# Patient Record
Sex: MALE | Race: WHITE | NOT HISPANIC OR LATINO | URBAN - METROPOLITAN AREA
[De-identification: names, ages, dates, MRNs, and addresses within clinical notes are randomized per-mention and may not be internally consistent; named-entity substitution may affect disease eponyms.]

---

## 2024-10-25 ENCOUNTER — EMERGENCY (EMERGENCY)
Facility: HOSPITAL | Age: 39
LOS: 0 days | Discharge: HOME | End: 2024-10-25
Attending: EMERGENCY MEDICINE
Payer: COMMERCIAL

## 2024-10-25 VITALS
WEIGHT: 195.11 LBS | DIASTOLIC BLOOD PRESSURE: 80 MMHG | RESPIRATION RATE: 18 BRPM | HEIGHT: 75 IN | SYSTOLIC BLOOD PRESSURE: 116 MMHG | HEART RATE: 70 BPM | OXYGEN SATURATION: 100 % | TEMPERATURE: 98 F

## 2024-10-25 PROCEDURE — 73610 X-RAY EXAM OF ANKLE: CPT | Mod: RT

## 2024-10-25 PROCEDURE — 73610 X-RAY EXAM OF ANKLE: CPT | Mod: 26,RT

## 2024-10-25 PROCEDURE — 99284 EMERGENCY DEPT VISIT MOD MDM: CPT

## 2024-10-25 PROCEDURE — 99283 EMERGENCY DEPT VISIT LOW MDM: CPT | Mod: 25

## 2024-10-25 NOTE — ED ADULT TRIAGE NOTE - HEART RATE (BEATS/MIN)
Physical Therapy Treatment    Patient Name:  Saji Castañeda   MRN:  [id number]    Recommendations:     Discharge Recommendations: LTACH (long-term acute care hospital)  Discharge Equipment Recommendations:  (TBD)  Barriers to discharge:  decreased mobility,strength and endurance    Assessment:     Saji Castañeda is a 74 y.o. male admitted with a medical diagnosis of Chronic osteomyelitis.  He presents with the following impairments/functional limitations: weakness, impaired endurance, impaired functional mobility, decreased upper extremity function, decreased lower extremity function, decreased ROM, impaired coordination, impaired skin, orthopedic precautions,pt with good participation and requires assistance with mobility at this time,unable to stand,pt will benefit from LTACH services upon discharge.    Rehab Prognosis: Fair; patient would benefit from acute skilled PT services to address these deficits and reach maximum level of function.    Recent Surgery: * No surgery found *      Plan:     During this hospitalization, patient to be seen 5 x/week to address the identified rehab impairments via therapeutic activities, therapeutic exercises, neuromuscular re-education and progress toward the following goals:    Plan of Care Expires:  06/20/23    Subjective     Chief Complaint: n/a  Patient/Family Comments/goals: pt agreeable to rx.  Pain/Comfort:  Pain Rating 1:  (no c/o's)      Objective:     Communicated with nsg prior to session.  Patient found supine with bed alarm, Condom Catheter, peripheral IV upon PT entry to room.     General Precautions: Standard, contact, fall  Orthopedic Precautions: LLE non weight bearing  Braces:  (R Darco)  Respiratory Status: Room air     Functional Mobility:  Bed Mobility:     Supine to Sit: supervision  Balance: good sitting balance      AM-PAC 6 CLICK MOBILITY  Turning over in bed (including adjusting bedclothes, sheets and blankets)?: 4  Sitting down on and standing up from a  chair with arms (e.g., wheelchair, bedside commode, etc.): 2  Moving from lying on back to sitting on the side of the bed?: 3  Moving to and from a bed to a chair (including a wheelchair)?: 2  Need to walk in hospital room?: 1  Climbing 3-5 steps with a railing?: 1  Basic Mobility Total Score: 13       Treatment & Education: le seated ex's 2 X 10 reps,EOB ~12 mins with S.      Patient left supine with all lines intact, call button in reach, and bed alarm on..    GOALS: see general POC  Multidisciplinary Problems       Physical Therapy Goals          Problem: Physical Therapy    Goal Priority Disciplines Outcome Goal Variances Interventions   Physical Therapy Goal     PT, PT/OT Ongoing, Progressing     Description: Goals to be met by: 23     Patient will increase functional independence with mobility by performin. Supine to sit with Modified [place]  2. Sit to supine with Modified [place]  3. Bed to chair transfer with Supervision  squat pivot or using sliding board   4. Wheelchair propulsion x 100 feet with Modified [place] using bilateral uppper extremities                         Time Tracking:     PT Received On: 23  PT Start Time: 1004     PT Stop Time: 1027  PT Total Time (min): 23 min     Billable Minutes: Therapeutic Activity 13 and Therapeutic Exercise 10    Treatment Type: Treatment  PT/PTA: PTA     Number of PTA visits since last PT visit: 3     2023   70

## 2024-10-25 NOTE — ED PROVIDER NOTE - PHYSICAL EXAMINATION
CONSTITUTIONAL: well-appearing, well nourished, non-toxic, NAD  SKIN: circumferential scaly rashes to b/l anterior shins, stage 3 pressure ulcer to right heel, stage 4 ulcer to left heel and lateral malleolus  HEAD: NCAT  EYES: EOMI, no scleral icterus  NECK: Full ROM.   EXT: right ankle swelling to lateral aspect, capillary refill intact, 2+ pt, and dp pulses  NEURO: no motor or sensation of b/l LE, in wheelchair   PSYCH: Cooperative, appropriate.

## 2024-10-25 NOTE — ED PROVIDER NOTE - PATIENT PORTAL LINK FT
You can access the FollowMyHealth Patient Portal offered by [facility] by registering at the following website: [email or web address]. By joining [employer]’s FollowMyHealth portal, you will also be able to view your health information using other applications (apps) compatible with our system.

## 2024-10-25 NOTE — ED PROVIDER NOTE - NSFOLLOWUPINSTRUCTIONS_ED_ALL_ED_FT
Follow up with your primary care physician  You do not have any noted fractures on the xrays    Here are some tips for treating ankle swelling:   RICE: For ankle injuries, you can try the RICE method for three days:   Rest: Avoid walking on the injured ankle   Ice: Apply ice packs to the ankle   Compression: Wrap the ankle with an elastic bandage   Elevation: Keep your toes raised above your nose   Support stockings: Put on support stockings in the morning before swelling worsens   Exercise: Move your feet and ankles often, and exercise your legs to pump fluid back to your heart   Diet: Eat a balanced diet and limit salt, which can increase swelling   Hydration: Drink enough water to help your body flush excess sodium   Avoid tight clothing: Don't wear tight clothing or garters around your thighs   Take breaks: When traveling, take breaks to stand up and move around   You should see a doctor if:   Your ankle is significantly swollen   You're limping after more than three days   You experience pain or discolored skin in the swollen area   You have an open sore on the swollen area   You have shortness of breath   You have swelling in only one limb   You have difficulty walking or moving   Ankle swelling can be caused by:   Prolonged sitting or standing   Being overweight   Aging   A blood clot in the leg   A leg infection   Venous insufficiency, which is when the veins in the legs can't pump blood back to the heart   Side effects from certain medications, such as hormone therapy, contraceptive pills, steroids, blood pressure medications, and antidepressants

## 2024-10-25 NOTE — ED ADULT NURSE NOTE - NSFALLRISKINTERV_ED_ALL_ED

## 2024-10-25 NOTE — ED ADULT TRIAGE NOTE - CHIEF COMPLAINT QUOTE
right ankle swelling, may have hit it while transferring himself from wheelchair but doesn't remember
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
## 2025-01-02 ENCOUNTER — INPATIENT (INPATIENT)
Facility: HOSPITAL | Age: 40
LOS: 10 days | Discharge: HOME HEALTH | DRG: 383 | End: 2025-01-13
Attending: STUDENT IN AN ORGANIZED HEALTH CARE EDUCATION/TRAINING PROGRAM | Admitting: HOSPITALIST
Payer: MEDICAID

## 2025-01-02 VITALS
TEMPERATURE: 98 F | OXYGEN SATURATION: 100 % | WEIGHT: 195.11 LBS | SYSTOLIC BLOOD PRESSURE: 119 MMHG | RESPIRATION RATE: 16 BRPM | HEART RATE: 76 BPM | DIASTOLIC BLOOD PRESSURE: 82 MMHG

## 2025-01-02 DIAGNOSIS — L08.9 LOCAL INFECTION OF THE SKIN AND SUBCUTANEOUS TISSUE, UNSPECIFIED: ICD-10-CM

## 2025-01-02 LAB
ALBUMIN SERPL ELPH-MCNC: 4.4 G/DL — SIGNIFICANT CHANGE UP (ref 3.5–5.2)
ALP SERPL-CCNC: 92 U/L — SIGNIFICANT CHANGE UP (ref 30–115)
ALT FLD-CCNC: 11 U/L — SIGNIFICANT CHANGE UP (ref 0–41)
ANION GAP SERPL CALC-SCNC: 10 MMOL/L — SIGNIFICANT CHANGE UP (ref 7–14)
AST SERPL-CCNC: 14 U/L — SIGNIFICANT CHANGE UP (ref 0–41)
BASOPHILS # BLD AUTO: 0.05 K/UL — SIGNIFICANT CHANGE UP (ref 0–0.2)
BASOPHILS NFR BLD AUTO: 0.6 % — SIGNIFICANT CHANGE UP (ref 0–1)
BILIRUB SERPL-MCNC: 0.3 MG/DL — SIGNIFICANT CHANGE UP (ref 0.2–1.2)
BUN SERPL-MCNC: 16 MG/DL — SIGNIFICANT CHANGE UP (ref 10–20)
CALCIUM SERPL-MCNC: 9.3 MG/DL — SIGNIFICANT CHANGE UP (ref 8.4–10.5)
CHLORIDE SERPL-SCNC: 103 MMOL/L — SIGNIFICANT CHANGE UP (ref 98–110)
CO2 SERPL-SCNC: 25 MMOL/L — SIGNIFICANT CHANGE UP (ref 17–32)
CREAT SERPL-MCNC: 0.7 MG/DL — SIGNIFICANT CHANGE UP (ref 0.7–1.5)
EGFR: 120 ML/MIN/1.73M2 — SIGNIFICANT CHANGE UP
EOSINOPHIL # BLD AUTO: 0.21 K/UL — SIGNIFICANT CHANGE UP (ref 0–0.7)
EOSINOPHIL NFR BLD AUTO: 2.4 % — SIGNIFICANT CHANGE UP (ref 0–8)
GLUCOSE SERPL-MCNC: 96 MG/DL — SIGNIFICANT CHANGE UP (ref 70–99)
HCT VFR BLD CALC: 47 % — SIGNIFICANT CHANGE UP (ref 42–52)
HGB BLD-MCNC: 15.8 G/DL — SIGNIFICANT CHANGE UP (ref 14–18)
IMM GRANULOCYTES NFR BLD AUTO: 0.3 % — SIGNIFICANT CHANGE UP (ref 0.1–0.3)
LACTATE SERPL-SCNC: 0.7 MMOL/L — SIGNIFICANT CHANGE UP (ref 0.7–2)
LYMPHOCYTES # BLD AUTO: 2.19 K/UL — SIGNIFICANT CHANGE UP (ref 1.2–3.4)
LYMPHOCYTES # BLD AUTO: 25 % — SIGNIFICANT CHANGE UP (ref 20.5–51.1)
MCHC RBC-ENTMCNC: 28.5 PG — SIGNIFICANT CHANGE UP (ref 27–31)
MCHC RBC-ENTMCNC: 33.6 G/DL — SIGNIFICANT CHANGE UP (ref 32–37)
MCV RBC AUTO: 84.7 FL — SIGNIFICANT CHANGE UP (ref 80–94)
MONOCYTES # BLD AUTO: 0.78 K/UL — HIGH (ref 0.1–0.6)
MONOCYTES NFR BLD AUTO: 8.9 % — SIGNIFICANT CHANGE UP (ref 1.7–9.3)
NEUTROPHILS # BLD AUTO: 5.5 K/UL — SIGNIFICANT CHANGE UP (ref 1.4–6.5)
NEUTROPHILS NFR BLD AUTO: 62.8 % — SIGNIFICANT CHANGE UP (ref 42.2–75.2)
NRBC # BLD: 0 /100 WBCS — SIGNIFICANT CHANGE UP (ref 0–0)
PLATELET # BLD AUTO: 221 K/UL — SIGNIFICANT CHANGE UP (ref 130–400)
PMV BLD: 9.6 FL — SIGNIFICANT CHANGE UP (ref 7.4–10.4)
POTASSIUM SERPL-MCNC: 4.3 MMOL/L — SIGNIFICANT CHANGE UP (ref 3.5–5)
POTASSIUM SERPL-SCNC: 4.3 MMOL/L — SIGNIFICANT CHANGE UP (ref 3.5–5)
PROT SERPL-MCNC: 7.5 G/DL — SIGNIFICANT CHANGE UP (ref 6–8)
RBC # BLD: 5.55 M/UL — SIGNIFICANT CHANGE UP (ref 4.7–6.1)
RBC # FLD: 12.9 % — SIGNIFICANT CHANGE UP (ref 11.5–14.5)
SODIUM SERPL-SCNC: 138 MMOL/L — SIGNIFICANT CHANGE UP (ref 135–146)
WBC # BLD: 8.76 K/UL — SIGNIFICANT CHANGE UP (ref 4.8–10.8)
WBC # FLD AUTO: 8.76 K/UL — SIGNIFICANT CHANGE UP (ref 4.8–10.8)

## 2025-01-02 PROCEDURE — 73721 MRI JNT OF LWR EXTRE W/O DYE: CPT | Mod: 26,LT

## 2025-01-02 PROCEDURE — 73718 MRI LOWER EXTREMITY W/O DYE: CPT | Mod: MC,LT

## 2025-01-02 PROCEDURE — 99223 1ST HOSP IP/OBS HIGH 75: CPT

## 2025-01-02 PROCEDURE — 87075 CULTR BACTERIA EXCEPT BLOOD: CPT

## 2025-01-02 PROCEDURE — 87205 SMEAR GRAM STAIN: CPT

## 2025-01-02 PROCEDURE — 82550 ASSAY OF CK (CPK): CPT

## 2025-01-02 PROCEDURE — 88304 TISSUE EXAM BY PATHOLOGIST: CPT

## 2025-01-02 PROCEDURE — 80053 COMPREHEN METABOLIC PANEL: CPT

## 2025-01-02 PROCEDURE — 36556 INSERT NON-TUNNEL CV CATH: CPT

## 2025-01-02 PROCEDURE — 80048 BASIC METABOLIC PNL TOTAL CA: CPT

## 2025-01-02 PROCEDURE — 73630 X-RAY EXAM OF FOOT: CPT | Mod: 26,LT

## 2025-01-02 PROCEDURE — 36415 COLL VENOUS BLD VENIPUNCTURE: CPT

## 2025-01-02 PROCEDURE — 80202 ASSAY OF VANCOMYCIN: CPT

## 2025-01-02 PROCEDURE — 83735 ASSAY OF MAGNESIUM: CPT

## 2025-01-02 PROCEDURE — 73718 MRI LOWER EXTREMITY W/O DYE: CPT | Mod: 26,LT,76

## 2025-01-02 PROCEDURE — 99291 CRITICAL CARE FIRST HOUR: CPT

## 2025-01-02 PROCEDURE — 85652 RBC SED RATE AUTOMATED: CPT

## 2025-01-02 PROCEDURE — 85027 COMPLETE CBC AUTOMATED: CPT

## 2025-01-02 PROCEDURE — 86140 C-REACTIVE PROTEIN: CPT

## 2025-01-02 PROCEDURE — 88311 DECALCIFY TISSUE: CPT

## 2025-01-02 PROCEDURE — 87186 SC STD MICRODIL/AGAR DIL: CPT

## 2025-01-02 PROCEDURE — 87070 CULTURE OTHR SPECIMN AEROBIC: CPT

## 2025-01-02 PROCEDURE — 73721 MRI JNT OF LWR EXTRE W/O DYE: CPT | Mod: MC,LT

## 2025-01-02 PROCEDURE — 87077 CULTURE AEROBIC IDENTIFY: CPT

## 2025-01-02 PROCEDURE — C1751: CPT

## 2025-01-02 PROCEDURE — 93925 LOWER EXTREMITY STUDY: CPT

## 2025-01-02 PROCEDURE — 85025 COMPLETE CBC W/AUTO DIFF WBC: CPT

## 2025-01-02 RX ORDER — NICOTINE POLACRILEX 4 MG/1
1 LOZENGE ORAL DAILY
Refills: 0 | Status: DISCONTINUED | OUTPATIENT
Start: 2025-01-02 | End: 2025-01-07

## 2025-01-02 RX ORDER — ACETAMINOPHEN 80 MG/.8ML
650 SOLUTION/ DROPS ORAL EVERY 6 HOURS
Refills: 0 | Status: DISCONTINUED | OUTPATIENT
Start: 2025-01-02 | End: 2025-01-07

## 2025-01-02 RX ORDER — ENOXAPARIN SODIUM 60 MG/.6ML
40 INJECTION INTRAVENOUS; SUBCUTANEOUS EVERY 24 HOURS
Refills: 0 | Status: DISCONTINUED | OUTPATIENT
Start: 2025-01-02 | End: 2025-01-07

## 2025-01-02 RX ORDER — VANCOMYCIN HYDROCHLORIDE 5 G/100ML
1000 INJECTION, POWDER, LYOPHILIZED, FOR SOLUTION INTRAVENOUS ONCE
Refills: 0 | Status: COMPLETED | OUTPATIENT
Start: 2025-01-02 | End: 2025-01-02

## 2025-01-02 RX ORDER — VANCOMYCIN HYDROCHLORIDE 5 G/100ML
1000 INJECTION, POWDER, LYOPHILIZED, FOR SOLUTION INTRAVENOUS EVERY 12 HOURS
Refills: 0 | Status: DISCONTINUED | OUTPATIENT
Start: 2025-01-02 | End: 2025-01-03

## 2025-01-02 RX ADMIN — VANCOMYCIN HYDROCHLORIDE 250 MILLIGRAM(S): 5 INJECTION, POWDER, LYOPHILIZED, FOR SOLUTION INTRAVENOUS at 17:11

## 2025-01-02 RX ADMIN — Medication 100 MILLIGRAM(S): at 23:54
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
## 2025-01-02 NOTE — PATIENT PROFILE ADULT - NSPROGENPREVTRANSF_GEN_A_NUR
Problem: Patient Care Overview  Goal: Plan of Care Review  Outcome: Ongoing (interventions implemented as appropriate)   09/10/18 0106   OTHER   Outcome Summary Patient has no c/o pain or nausea. Midline incision. Epidural expected to be discontinued today (Monday, 9/10). Hold Lovenox for epidural removal. Otherwise, a quiet night for Luann.   Coping/Psychosocial   Plan of Care Reviewed With patient   Plan of Care Review   Progress no change     Goal: Individualization and Mutuality  Outcome: Ongoing (interventions implemented as appropriate)      Problem: Pain, Acute (Adult)  Goal: Acceptable Pain Control/Comfort Level  Outcome: Ongoing (interventions implemented as appropriate)         no history of blood product transfusion
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
## 2025-01-02 NOTE — H&P ADULT - NSHPPHYSICALEXAM_GEN_ALL_CORE
VITALS:     ICU Vital Signs Last 24 Hrs  T(C): 36.5 (02 Jan 2025 15:08), Max: 36.5 (02 Jan 2025 15:08)  T(F): 97.7 (02 Jan 2025 15:08), Max: 97.7 (02 Jan 2025 15:08)  HR: 76 (02 Jan 2025 15:08) (76 - 76)  BP: 119/82 (02 Jan 2025 15:08) (119/82 - 119/82)  RR: 16 (02 Jan 2025 15:08) (16 - 16)  SpO2: 100% (02 Jan 2025 15:08) (100% - 100%)    O2 Parameters below as of 02 Jan 2025 15:08  Patient On (Oxygen Delivery Method): room air    GENERAL: NAD, lying in bed comfortably  HEAD:  Atraumatic, Normocephalic  EYES: EOMI, PERRLA, conjunctiva and sclera clear  ENT: Moist mucous membranes  NECK: Supple, No JVD  CHEST/LUNG: Clear to auscultation bilaterally; No rales, rhonchi, wheezing, or rubs. Unlabored respirations  HEART: Regular rate and rhythm; No murmurs, rubs, or gallops  ABDOMEN: ; Soft, Nontender, Nondistended. No hepatomegally  EXTREMITIES:   b/l LE decreased muscle tone, R heel ulcer 1 cm deep. L heel ulcer and Lateral ankle, foul smell, no bleeding, no tenderness to palpaiton, b/L feet edema no tenderness. No erythema. No ulcer between toes b/l feet.   NERVOUS SYSTEM:  Alert & Oriented X3, speech clear. No deficits   MSK:  unable to move LE   SKIN: as above, no sacral ulcer

## 2025-01-02 NOTE — H&P ADULT - NSHPLABSRESULTS_GEN_ALL_CORE
15.8   8.76  )-----------( 221      ( 02 Jan 2025 16:15 )             47.0       01-02    138  |  103  |  16  ----------------------------<  96  4.3   |  25  |  0.7    Ca    9.3      02 Jan 2025 16:15    TPro  7.5  /  Alb  4.4  /  TBili  0.3  /  DBili  x   /  AST  14  /  ALT  11  /  AlkPhos  92  01-02            Urinalysis Basic - ( 02 Jan 2025 16:15 )    Color: x / Appearance: x / SG: x / pH: x  Gluc: 96 mg/dL / Ketone: x  / Bili: x / Urobili: x   Blood: x / Protein: x / Nitrite: x   Leuk Esterase: x / RBC: x / WBC x   Sq Epi: x / Non Sq Epi: x / Bacteria: x      Lactate Trend  01-02 @ 16:15 Lactate:0.7         < from: Xray Foot AP + Lateral + Oblique, Left (01.02.25 @ 15:46) >      FINDINGS/  IMPRESSION:    No acute fractures or dislocations are seen.    Ossific densities are seen within the soft tissues in the region of the   Achilles tendon insertion and adjacent to a tiny plantar calcaneal spur.   There is a also an ulcer of the heel pad. No evidence of osteomyelitis.    The bones are osteopenic.    --- End of Report ---    RADHA CACERES MD; Attending Radiologist  This document has been electronically signed. Jan 2 2025  5:20PM    < end of copied text >

## 2025-01-02 NOTE — PATIENT PROFILE ADULT - FALL HARM RISK - HARM RISK INTERVENTIONS

## 2025-01-02 NOTE — PATIENT PROFILE ADULT - BILL PAYMENT
[Fully active, able to carry on all pre-disease performance without restriction] : Status 0 - Fully active, able to carry on all pre-disease performance without restriction [Normal] : bilateral breasts without nipple retraction, skin dimpling or palpable masses; the bilateral axillae are without adenopathy [de-identified] : left breast lower medical scar, left lumpectomy scar no

## 2025-01-02 NOTE — H&P ADULT - NS ATTEND AMEND GEN_ALL_CORE FT
pt seen and examined on day of admission  1/2  changes to plan made above as necessary   plan of care discussed with support staff pt seen and examined on day of admission  1/2  changes to plan made above as necessary   plan of care discussed with support staff  prepare for dc in 24 hrs , bl le wound looks well demarcated and clean , very little pus noted in heel LLE - pending podiatry clearance, mri and id eval
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
## 2025-01-02 NOTE — ED PROVIDER NOTE - PHYSICAL EXAMINATION
GEN: Patient in no acute distress  MS: Normal ROM in all extremities. No midline spinal tenderness. pulses 2 +. no calf tenderness or swelling.  SKIN: + ulcers noted to left lateral malleoli and heel, + foul odorous drainage.  Warm, dry, no acute rashes. Good turgor  NEURO: No sensory deficits
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
## 2025-01-02 NOTE — PATIENT PROFILE ADULT - FUNCTIONAL ASSESSMENT - BASIC MOBILITY 6.
Recommendation Preamble: Assessment: visit done via [employer]
Detail Level: Simple
1 = Total assistance
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
## 2025-01-03 LAB
ALBUMIN SERPL ELPH-MCNC: 4 G/DL — SIGNIFICANT CHANGE UP (ref 3.5–5.2)
ALP SERPL-CCNC: 79 U/L — SIGNIFICANT CHANGE UP (ref 30–115)
ALT FLD-CCNC: 10 U/L — SIGNIFICANT CHANGE UP (ref 0–41)
ANION GAP SERPL CALC-SCNC: 9 MMOL/L — SIGNIFICANT CHANGE UP (ref 7–14)
AST SERPL-CCNC: 10 U/L — SIGNIFICANT CHANGE UP (ref 0–41)
BASOPHILS # BLD AUTO: 0.06 K/UL — SIGNIFICANT CHANGE UP (ref 0–0.2)
BASOPHILS NFR BLD AUTO: 0.7 % — SIGNIFICANT CHANGE UP (ref 0–1)
BILIRUB SERPL-MCNC: 0.2 MG/DL — SIGNIFICANT CHANGE UP (ref 0.2–1.2)
BUN SERPL-MCNC: 18 MG/DL — SIGNIFICANT CHANGE UP (ref 10–20)
CALCIUM SERPL-MCNC: 9 MG/DL — SIGNIFICANT CHANGE UP (ref 8.4–10.5)
CHLORIDE SERPL-SCNC: 104 MMOL/L — SIGNIFICANT CHANGE UP (ref 98–110)
CO2 SERPL-SCNC: 25 MMOL/L — SIGNIFICANT CHANGE UP (ref 17–32)
CREAT SERPL-MCNC: 0.9 MG/DL — SIGNIFICANT CHANGE UP (ref 0.7–1.5)
CRP SERPL-MCNC: 32.5 MG/L — HIGH
EGFR: 111 ML/MIN/1.73M2 — SIGNIFICANT CHANGE UP
EOSINOPHIL # BLD AUTO: 0.23 K/UL — SIGNIFICANT CHANGE UP (ref 0–0.7)
EOSINOPHIL NFR BLD AUTO: 2.8 % — SIGNIFICANT CHANGE UP (ref 0–8)
ERYTHROCYTE [SEDIMENTATION RATE] IN BLOOD: 17 MM/HR — HIGH (ref 0–10)
GLUCOSE SERPL-MCNC: 94 MG/DL — SIGNIFICANT CHANGE UP (ref 70–99)
HCT VFR BLD CALC: 43.7 % — SIGNIFICANT CHANGE UP (ref 42–52)
HGB BLD-MCNC: 14.3 G/DL — SIGNIFICANT CHANGE UP (ref 14–18)
IMM GRANULOCYTES NFR BLD AUTO: 0.4 % — HIGH (ref 0.1–0.3)
LYMPHOCYTES # BLD AUTO: 1.89 K/UL — SIGNIFICANT CHANGE UP (ref 1.2–3.4)
LYMPHOCYTES # BLD AUTO: 22.7 % — SIGNIFICANT CHANGE UP (ref 20.5–51.1)
MCHC RBC-ENTMCNC: 28.1 PG — SIGNIFICANT CHANGE UP (ref 27–31)
MCHC RBC-ENTMCNC: 32.7 G/DL — SIGNIFICANT CHANGE UP (ref 32–37)
MCV RBC AUTO: 86 FL — SIGNIFICANT CHANGE UP (ref 80–94)
MONOCYTES # BLD AUTO: 0.85 K/UL — HIGH (ref 0.1–0.6)
MONOCYTES NFR BLD AUTO: 10.2 % — HIGH (ref 1.7–9.3)
NEUTROPHILS # BLD AUTO: 5.28 K/UL — SIGNIFICANT CHANGE UP (ref 1.4–6.5)
NEUTROPHILS NFR BLD AUTO: 63.2 % — SIGNIFICANT CHANGE UP (ref 42.2–75.2)
NRBC # BLD: 0 /100 WBCS — SIGNIFICANT CHANGE UP (ref 0–0)
PLATELET # BLD AUTO: 196 K/UL — SIGNIFICANT CHANGE UP (ref 130–400)
PMV BLD: 10.5 FL — HIGH (ref 7.4–10.4)
POTASSIUM SERPL-MCNC: 4 MMOL/L — SIGNIFICANT CHANGE UP (ref 3.5–5)
POTASSIUM SERPL-SCNC: 4 MMOL/L — SIGNIFICANT CHANGE UP (ref 3.5–5)
PROT SERPL-MCNC: 6.6 G/DL — SIGNIFICANT CHANGE UP (ref 6–8)
RBC # BLD: 5.08 M/UL — SIGNIFICANT CHANGE UP (ref 4.7–6.1)
RBC # FLD: 12.9 % — SIGNIFICANT CHANGE UP (ref 11.5–14.5)
SODIUM SERPL-SCNC: 138 MMOL/L — SIGNIFICANT CHANGE UP (ref 135–146)
WBC # BLD: 8.34 K/UL — SIGNIFICANT CHANGE UP (ref 4.8–10.8)
WBC # FLD AUTO: 8.34 K/UL — SIGNIFICANT CHANGE UP (ref 4.8–10.8)

## 2025-01-03 PROCEDURE — 11042 DBRDMT SUBQ TIS 1ST 20SQCM/<: CPT | Mod: RT,59,GC

## 2025-01-03 PROCEDURE — 99233 SBSQ HOSP IP/OBS HIGH 50: CPT

## 2025-01-03 PROCEDURE — 11043 DBRDMT MUSC&/FSCA 1ST 20/<: CPT | Mod: GC,LT

## 2025-01-03 PROCEDURE — 99222 1ST HOSP IP/OBS MODERATE 55: CPT | Mod: GC,25

## 2025-01-03 RX ORDER — VANCOMYCIN HYDROCHLORIDE 5 G/100ML
1500 INJECTION, POWDER, LYOPHILIZED, FOR SOLUTION INTRAVENOUS EVERY 12 HOURS
Refills: 0 | Status: DISCONTINUED | OUTPATIENT
Start: 2025-01-03 | End: 2025-01-07

## 2025-01-03 RX ADMIN — VANCOMYCIN HYDROCHLORIDE 250 MILLIGRAM(S): 5 INJECTION, POWDER, LYOPHILIZED, FOR SOLUTION INTRAVENOUS at 05:12

## 2025-01-03 RX ADMIN — Medication 100 MILLIGRAM(S): at 13:11

## 2025-01-03 RX ADMIN — Medication 100 MILLIGRAM(S): at 22:20

## 2025-01-03 RX ADMIN — Medication 100 MILLIGRAM(S): at 05:09

## 2025-01-03 RX ADMIN — VANCOMYCIN HYDROCHLORIDE 300 MILLIGRAM(S): 5 INJECTION, POWDER, LYOPHILIZED, FOR SOLUTION INTRAVENOUS at 18:44
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
## 2025-01-03 NOTE — DIETITIAN INITIAL EVALUATION ADULT - NAME AND PHONE
Destiny Brown  [employer]     High risk follow up      Monitoring/Evaluating: Labs, Lytes, Wts, PO intake, Diet and texture tolerance, NFPF, GI S/S, BM.

## 2025-01-03 NOTE — PROGRESS NOTE ADULT - SUBJECTIVE AND OBJECTIVE BOX
Patient is a 39y old  Male who presents with a chief complaint of L heel /ankle and R heel ulcer (03 Jan 2025 07:45)    MEDICATIONS  (STANDING):  cefepime   IVPB      cefepime   IVPB 2000 milliGRAM(s) IV Intermittent every 8 hours  enoxaparin Injectable 40 milliGRAM(s) SubCutaneous every 24 hours  nicotine -  14 mG/24Hr(s) Patch 1 Patch Transdermal daily  vancomycin  IVPB 1000 milliGRAM(s) IV Intermittent every 12 hours    MEDICATIONS  (PRN):  acetaminophen     Tablet .. 650 milliGRAM(s) Oral every 6 hours PRN Temp greater or equal to 38C (100.4F), Mild Pain (1 - 3)      CAPILLARY BLOOD GLUCOSE  I&O's Summary      PHYSICAL EXAM:  Vital Signs Last 24 Hrs  T(C): 36.7 (03 Jan 2025 05:32), Max: 36.7 (02 Jan 2025 22:20)  T(F): 98 (03 Jan 2025 05:32), Max: 98.1 (02 Jan 2025 22:20)  HR: 82 (03 Jan 2025 05:32) (76 - 82)  BP: 104/51 (03 Jan 2025 05:32) (104/51 - 119/86)  BP(mean): --  RR: 17 (03 Jan 2025 05:32) (16 - 17)  SpO2: 97% (03 Jan 2025 05:32) (96% - 100%)    Parameters below as of 02 Jan 2025 15:08  Patient On (Oxygen Delivery Method): room air      GENERAL: No acute distress, well-developed  HEAD:  Atraumatic, Normocephalic  EYES: conjunctiva and sclera clear  NECK: Supple, no JVD  CHEST/LUNG: CTAB; No wheezes, rales, or rhonchi  HEART: Regular rate and rhythm; No murmurs  ABDOMEN: Soft, non-tender, non-distended  EXTREMITIES:  Left foot dressed in ace, + pain   NEUROLOGY: A&O x 3, no focal deficits  SKIN: No rashes or lesions    LABS:                        14.3   8.34  )-----------( 196      ( 03 Jan 2025 08:01 )             43.7     01-03    138  |  104  |  18  ----------------------------<  94  4.0   |  25  |  0.9    Ca    9.0      03 Jan 2025 08:01    TPro  6.6  /  Alb  4.0  /  TBili  0.2  /  DBili  x   /  AST  10  /  ALT  10  /  AlkPhos  79  01-03    Urinalysis Basic - ( 03 Jan 2025 08:01 )    Color: x / Appearance: x / SG: x / pH: x  Gluc: 94 mg/dL / Ketone: x  / Bili: x / Urobili: x   Blood: x / Protein: x / Nitrite: x   Leuk Esterase: x / RBC: x / WBC x   Sq Epi: x / Non Sq Epi: x / Bacteria: x

## 2025-01-03 NOTE — DIETITIAN INITIAL EVALUATION ADULT - ADD RECOMMEND
1. add 2 packet of Alcon  to provide 180 kcal + 5 grams protein to aid in promoting Wound healing   2. Add 1 x/day Ensure MAX (150 kcal + 30 grams protein / carton) (any flavor)  3. Monitor PO intake.   4. MVI to meet 100% DRIs.  5. Vitamin C as pt is active smoker.

## 2025-01-03 NOTE — DIETITIAN INITIAL EVALUATION ADULT - NSFNSPHYEXAMSKINFT_GEN_A_CORE
Pressure Injury 1: Left:, heel, Unstageable  Pressure Injury 2: Right:, heel, Unstageable  Pressure Injury 3: none, none  Pressure Injury 4: none, none  Pressure Injury 5: none, none  Pressure Injury 6: none, none  Pressure Injury 7: none, none  Pressure Injury 8: none, none  Pressure Injury 9: none, none  Pressure Injury 10: none, none  Pressure Injury 11: none, none Pressure Injury 1: Left:, heel, Unstageable  Pressure Injury 2: Right:, heel, Unstageable

## 2025-01-03 NOTE — DIETITIAN INITIAL EVALUATION ADULT - OTHER CALCULATIONS
Estimated energy and protein needs with consideration for weight maintenance/gain, BMI, age, mobility, wound healing, and comorbidities.

## 2025-01-03 NOTE — DIETITIAN INITIAL EVALUATION ADULT - ORAL INTAKE PTA/DIET HISTORY
RD spoke with family over phone to obtain Nutrition hx. Pt lives with brother Caprice ([phone]). NFKA. UBW: 240 lbs. No noticeable weight changes. Follows Halal diet at home. No protein shakes or MVI. No issues with chewing or swallowing. Eating 2 meals at home with good appetite.     Subjective:  RD spoke with family over phone to obtain Nutrition hx. Pt lives with brother Caprice ([phone]). NFKA. UBW: 240 lbs. No noticeable weight changes. Follows Halal diet at home. No protein shakes or MVI. No issues with chewing or swallowing. Eating 2 meals at home with good appetite.     Subjective: Pt resting in bed. Reports having good appetite. Eating well. [place] for lunch. smoking 1/2 pack /day. No over s/s of malnutrition. Denies any recent weight loss.
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
## 2025-01-03 NOTE — DIETITIAN INITIAL EVALUATION ADULT - PERTINENT LABORATORY DATA
01-03    138  |  104  |  18  ----------------------------<  94  4.0   |  25  |  0.9    Ca    9.0      03 Jan 2025 08:01    TPro  6.6  /  Alb  4.0  /  TBili  0.2  /  DBili  x   /  AST  10  /  ALT  10  /  AlkPhos  79  01-03

## 2025-01-03 NOTE — DIETITIAN INITIAL EVALUATION ADULT - PERTINENT MEDS FT
MEDICATIONS  (STANDING):  cefepime   IVPB      cefepime   IVPB 2000 milliGRAM(s) IV Intermittent every 8 hours  enoxaparin Injectable 40 milliGRAM(s) SubCutaneous every 24 hours  nicotine -  14 mG/24Hr(s) Patch 1 Patch Transdermal daily  vancomycin  IVPB 1500 milliGRAM(s) IV Intermittent every 12 hours    MEDICATIONS  (PRN):  acetaminophen     Tablet .. 650 milliGRAM(s) Oral every 6 hours PRN Temp greater or equal to 38C (100.4F), Mild Pain (1 - 3)
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
## 2025-01-03 NOTE — CONSULT NOTE ADULT - ATTENDING COMMENTS
Agree with above note    B/L Heel wound with likely underlying OM  Sharp Excisional debridements of Fibrotic tissue from and including deep tissue and fascia L heel, Size 2cm x 2cm   - Deep wound culture taken  - wound irrigated and dressed  Sharp Excisional debridements of Fibrotic tissue from and including subcutaneous layer R heel, Size 2cm x 2cm  - wound irrigated and dressed    Request ID consult - please follow up wound cultures  A duplex - if abnormalities please consult Dr. Wan   MRI pending  Possible OR for further wound debridement   Podiatry following

## 2025-01-04 LAB
ERYTHROCYTE [SEDIMENTATION RATE] IN BLOOD: 25 MM/HR — HIGH (ref 0–10)
GRAM STN FLD: ABNORMAL
SPECIMEN SOURCE: SIGNIFICANT CHANGE UP
VANCOMYCIN FLD-MCNC: 26 UG/ML — HIGH (ref 5–10)
VANCOMYCIN TROUGH SERPL-MCNC: 9.3 UG/ML — SIGNIFICANT CHANGE UP (ref 5–10)

## 2025-01-04 PROCEDURE — 99233 SBSQ HOSP IP/OBS HIGH 50: CPT

## 2025-01-04 PROCEDURE — 99231 SBSQ HOSP IP/OBS SF/LOW 25: CPT | Mod: GC

## 2025-01-04 PROCEDURE — 93925 LOWER EXTREMITY STUDY: CPT | Mod: 26

## 2025-01-04 RX ADMIN — Medication 100 MILLIGRAM(S): at 05:19

## 2025-01-04 RX ADMIN — VANCOMYCIN HYDROCHLORIDE 300 MILLIGRAM(S): 5 INJECTION, POWDER, LYOPHILIZED, FOR SOLUTION INTRAVENOUS at 17:36

## 2025-01-04 RX ADMIN — Medication 100 MILLIGRAM(S): at 21:14

## 2025-01-04 RX ADMIN — VANCOMYCIN HYDROCHLORIDE 300 MILLIGRAM(S): 5 INJECTION, POWDER, LYOPHILIZED, FOR SOLUTION INTRAVENOUS at 05:19

## 2025-01-04 RX ADMIN — Medication 100 MILLIGRAM(S): at 13:00
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
## 2025-01-04 NOTE — PROGRESS NOTE ADULT - SUBJECTIVE AND OBJECTIVE BOX
Podiatry Progress Note    Subjective:  KATHLEEN HARVEY is a  39y Male.   Seen bedside.   Patient is a 39y old  Male who presents with a chief complaint of Local infection of skin and subcutaneous tissue     (03 Jan 2025 15:58)      Past Medical History and Surgical History  PAST MEDICAL & SURGICAL HISTORY:       Objective:  Vital Signs Last 24 Hrs  T(C): 36.6 (04 Jan 2025 05:39), Max: 36.7 (03 Jan 2025 22:35)  T(F): 97.9 (04 Jan 2025 05:39), Max: 98.1 (03 Jan 2025 22:35)  HR: 57 (04 Jan 2025 05:39) (57 - 77)  BP: 115/67 (04 Jan 2025 05:39) (92/52 - 115/67)  BP(mean): --  RR: 18 (04 Jan 2025 05:39) (18 - 18)  SpO2: 100% (04 Jan 2025 05:39) (97% - 100%)    Parameters below as of 04 Jan 2025 05:39  Patient On (Oxygen Delivery Method): room air                            14.3   8.34  )-----------( 196      ( 03 Jan 2025 08:01 )             43.7                 01-03    138  |  104  |  18  ----------------------------<  94  4.0   |  25  |  0.9    Ca    9.0      03 Jan 2025 08:01    TPro  6.6  /  Alb  4.0  /  TBili  0.2  /  DBili  x   /  AST  10  /  ALT  10  /  AlkPhos  79  01-03        Physical Exam - Lower Extremity Focused:   Derm:   Bilateral heel ulcers probe to bone, malodor noted, necrotic tissue to left heel ulcer, fibrogranular tissue to right heel ulcer  Left lateral ankle wound with granular base and hyperkeratotic rim  Vascular: DP and PT Pulses Diminished; Foot is Warm to Warm to the touch;    MSK: paraplegic B/L LE     Assessment:  Left heel Plantar Ulcer - Probes Deep to Bone with acute sings if infection  Right heel ulcer  L ankle wound, no clinical sings of infection     Plan:  Chart reviewed and Patient evaluated. All Questions and Concerns Addressed and Answered  XR Imaging  Foot; reviewed  MRI foot/ankle results reviewed  Wound Culture Obtained; Sent to Microbiology; Pending Results   Local Wound Care; Wound Flushed w/ Vashe; Wound Packed w/aquacel/xeroform/dsd/abd/kerlix/ace bilateral feet  Patient is wheelchair bound - limited mobility of lower extremity  Request ID Consult;  Follow Up w/ Wound Culture  Plan for OR debridement Wed 1/8 of bilateral feet   Discussed Plan w/ Dr. Galvan    Podiatry    Podiatry Progress Note    Subjective:  KATHLEEN HARVEY is a  39y Male.   Seen bedside.   Patient is a 39y old  Male who presents with a chief complaint of Local infection of skin and subcutaneous tissue     (03 Jan 2025 15:58)      Past Medical History and Surgical History  PAST MEDICAL & SURGICAL HISTORY:       Objective:  Vital Signs Last 24 Hrs  T(C): 36.6 (04 Jan 2025 05:39), Max: 36.7 (03 Jan 2025 22:35)  T(F): 97.9 (04 Jan 2025 05:39), Max: 98.1 (03 Jan 2025 22:35)  HR: 57 (04 Jan 2025 05:39) (57 - 77)  BP: 115/67 (04 Jan 2025 05:39) (92/52 - 115/67)  BP(mean): --  RR: 18 (04 Jan 2025 05:39) (18 - 18)  SpO2: 100% (04 Jan 2025 05:39) (97% - 100%)    Parameters below as of 04 Jan 2025 05:39  Patient On (Oxygen Delivery Method): room air                            14.3   8.34  )-----------( 196      ( 03 Jan 2025 08:01 )             43.7                 01-03    138  |  104  |  18  ----------------------------<  94  4.0   |  25  |  0.9    Ca    9.0      03 Jan 2025 08:01    TPro  6.6  /  Alb  4.0  /  TBili  0.2  /  DBili  x   /  AST  10  /  ALT  10  /  AlkPhos  79  01-03        Physical Exam - Lower Extremity Focused:   Derm:   Bilateral heel ulcers probe to bone, malodor noted, necrotic tissue to left heel ulcer, fibrogranular tissue to right heel ulcer  Left lateral ankle wound with granular base and hyperkeratotic rim  Vascular: DP and PT Pulses Diminished; Foot is Warm to Warm to the touch;    MSK: paraplegic B/L LE     Assessment:  Left heel Plantar Ulcer - Probes Deep to Bone with acute sings if infection  Right heel ulcer  L ankle wound, no clinical sings of infection     Plan:  Chart reviewed and Patient evaluated. All Questions and Concerns Addressed and Answered  XR Imaging  Foot; reviewed  MRI foot/ankle results reviewed  Wound Culture Obtained; Sent to Microbiology; Pending Results   Local Wound Care; Wound Flushed w/ Vashe; Wound Packed w/aquacel/xeroform/dsd/abd/kerlix/ace bilateral feet  Patient is wheelchair bound - limited mobility of lower extremity  Request ID Consult;  Follow Up w/ Wound Culture  Plan for OR debridement Tuesday or Wednesday of bilateral feet   Discussed Plan w/ Dr. Galvan    Podiatry

## 2025-01-04 NOTE — PROGRESS NOTE ADULT - SUBJECTIVE AND OBJECTIVE BOX
Patient is a 39y old  Male who presents with a chief complaint of L heel /ankle and R heel ulcer (04 Jan 2025 10:07)      MEDICATIONS  (STANDING):  cefepime   IVPB      cefepime   IVPB 2000 milliGRAM(s) IV Intermittent every 8 hours  enoxaparin Injectable 40 milliGRAM(s) SubCutaneous every 24 hours  nicotine -  14 mG/24Hr(s) Patch 1 Patch Transdermal daily  vancomycin  IVPB 1500 milliGRAM(s) IV Intermittent every 12 hours    MEDICATIONS  (PRN):  acetaminophen     Tablet .. 650 milliGRAM(s) Oral every 6 hours PRN Temp greater or equal to 38C (100.4F), Mild Pain (1 - 3)      CAPILLARY BLOOD GLUCOSE        I&O's Summary    03 Jan 2025 07:01  -  04 Jan 2025 07:00  --------------------------------------------------------  IN: 0 mL / OUT: 2000 mL / NET: -2000 mL        PHYSICAL EXAM:  Vital Signs Last 24 Hrs  T(C): 36.6 (04 Jan 2025 05:39), Max: 36.7 (03 Jan 2025 22:35)  T(F): 97.9 (04 Jan 2025 05:39), Max: 98.1 (03 Jan 2025 22:35)  HR: 57 (04 Jan 2025 05:39) (57 - 77)  BP: 115/67 (04 Jan 2025 05:39) (92/52 - 115/67)  BP(mean): --  RR: 18 (04 Jan 2025 05:39) (18 - 18)  SpO2: 100% (04 Jan 2025 05:39) (97% - 100%)    Parameters below as of 04 Jan 2025 05:39  Patient On (Oxygen Delivery Method): room air          GENERAL: No acute distress, well-developed  HEAD:  Atraumatic, Normocephalic  EYES: conjunctiva and sclera clear  NECK: Supple, no JVD  CHEST/LUNG: CTAB; No wheezes, rales, or rhonchi  HEART: Regular rate and rhythm; No murmurs  ABDOMEN: Soft, non-tender, non-distended  EXTREMITIES:  Left foot dressed in ace, + pain   NEUROLOGY: A&O x 3, no focal deficits  SKIN: No rashes or lesions      LABS:                        14.3   8.34  )-----------( 196      ( 03 Jan 2025 08:01 )             43.7     01-03    138  |  104  |  18  ----------------------------<  94  4.0   |  25  |  0.9    Ca    9.0      03 Jan 2025 08:01    TPro  6.6  /  Alb  4.0  /  TBili  0.2  /  DBili  x   /  AST  10  /  ALT  10  /  AlkPhos  79  01-03          Urinalysis Basic - ( 03 Jan 2025 08:01 )    Color: x / Appearance: x / SG: x / pH: x  Gluc: 94 mg/dL / Ketone: x  / Bili: x / Urobili: x   Blood: x / Protein: x / Nitrite: x   Leuk Esterase: x / RBC: x / WBC x   Sq Epi: x / Non Sq Epi: x / Bacteria: x        Culture - Abscess with Gram Stain (collected 03 Jan 2025 08:15)  Source: .Abscess  Gram Stain (04 Jan 2025 02:11):    No polymorphonuclear leukocytes seen per low power field    Numerous Gram Negative Rods seen per oil power field    Numerous Gram Positive Rods seen per oil power field    Moderate Gram positive cocci in pairs seen per oil power field    Culture - Blood (collected 02 Jan 2025 16:15)  Source: .Blood BLOOD  Preliminary Report (04 Jan 2025 01:01):    No growth at 24 hours    Culture - Blood (collected 02 Jan 2025 16:15)  Source: .Blood BLOOD  Preliminary Report (04 Jan 2025 01:01):    No growth at 24 hours

## 2025-01-05 LAB
-  AMOXICILLIN/CLAVULANIC ACID: SIGNIFICANT CHANGE UP
-  AMPICILLIN/SULBACTAM: SIGNIFICANT CHANGE UP
-  AMPICILLIN: SIGNIFICANT CHANGE UP
-  AZTREONAM: SIGNIFICANT CHANGE UP
-  AZTREONAM: SIGNIFICANT CHANGE UP
-  CEFAZOLIN: SIGNIFICANT CHANGE UP
-  CEFEPIME: SIGNIFICANT CHANGE UP
-  CEFEPIME: SIGNIFICANT CHANGE UP
-  CEFOXITIN: SIGNIFICANT CHANGE UP
-  CEFTAZIDIME: SIGNIFICANT CHANGE UP
-  CEFTRIAXONE: SIGNIFICANT CHANGE UP
-  CIPROFLOXACIN: SIGNIFICANT CHANGE UP
-  CIPROFLOXACIN: SIGNIFICANT CHANGE UP
-  ERTAPENEM: SIGNIFICANT CHANGE UP
-  GENTAMICIN: SIGNIFICANT CHANGE UP
-  IMIPENEM: SIGNIFICANT CHANGE UP
-  LEVOFLOXACIN: SIGNIFICANT CHANGE UP
-  LEVOFLOXACIN: SIGNIFICANT CHANGE UP
-  MEROPENEM: SIGNIFICANT CHANGE UP
-  MEROPENEM: SIGNIFICANT CHANGE UP
-  PIPERACILLIN/TAZOBACTAM: SIGNIFICANT CHANGE UP
-  PIPERACILLIN/TAZOBACTAM: SIGNIFICANT CHANGE UP
-  TOBRAMYCIN: SIGNIFICANT CHANGE UP
-  TRIMETHOPRIM/SULFAMETHOXAZOLE: SIGNIFICANT CHANGE UP
ALBUMIN SERPL ELPH-MCNC: 4.3 G/DL — SIGNIFICANT CHANGE UP (ref 3.5–5.2)
ALP SERPL-CCNC: 78 U/L — SIGNIFICANT CHANGE UP (ref 30–115)
ALT FLD-CCNC: 11 U/L — SIGNIFICANT CHANGE UP (ref 0–41)
ANION GAP SERPL CALC-SCNC: 12 MMOL/L — SIGNIFICANT CHANGE UP (ref 7–14)
AST SERPL-CCNC: 12 U/L — SIGNIFICANT CHANGE UP (ref 0–41)
BASOPHILS # BLD AUTO: 0.05 K/UL — SIGNIFICANT CHANGE UP (ref 0–0.2)
BASOPHILS NFR BLD AUTO: 0.7 % — SIGNIFICANT CHANGE UP (ref 0–1)
BILIRUB SERPL-MCNC: 0.5 MG/DL — SIGNIFICANT CHANGE UP (ref 0.2–1.2)
BUN SERPL-MCNC: 11 MG/DL — SIGNIFICANT CHANGE UP (ref 10–20)
CALCIUM SERPL-MCNC: 9 MG/DL — SIGNIFICANT CHANGE UP (ref 8.4–10.5)
CHLORIDE SERPL-SCNC: 102 MMOL/L — SIGNIFICANT CHANGE UP (ref 98–110)
CO2 SERPL-SCNC: 24 MMOL/L — SIGNIFICANT CHANGE UP (ref 17–32)
CREAT SERPL-MCNC: 0.8 MG/DL — SIGNIFICANT CHANGE UP (ref 0.7–1.5)
EGFR: 115 ML/MIN/1.73M2 — SIGNIFICANT CHANGE UP
EOSINOPHIL # BLD AUTO: 0.26 K/UL — SIGNIFICANT CHANGE UP (ref 0–0.7)
EOSINOPHIL NFR BLD AUTO: 3.5 % — SIGNIFICANT CHANGE UP (ref 0–8)
GLUCOSE SERPL-MCNC: 86 MG/DL — SIGNIFICANT CHANGE UP (ref 70–99)
HCT VFR BLD CALC: 44.6 % — SIGNIFICANT CHANGE UP (ref 42–52)
HGB BLD-MCNC: 15.2 G/DL — SIGNIFICANT CHANGE UP (ref 14–18)
IMM GRANULOCYTES NFR BLD AUTO: 0.1 % — SIGNIFICANT CHANGE UP (ref 0.1–0.3)
LYMPHOCYTES # BLD AUTO: 2.12 K/UL — SIGNIFICANT CHANGE UP (ref 1.2–3.4)
LYMPHOCYTES # BLD AUTO: 28.3 % — SIGNIFICANT CHANGE UP (ref 20.5–51.1)
MCHC RBC-ENTMCNC: 28.7 PG — SIGNIFICANT CHANGE UP (ref 27–31)
MCHC RBC-ENTMCNC: 34.1 G/DL — SIGNIFICANT CHANGE UP (ref 32–37)
MCV RBC AUTO: 84.2 FL — SIGNIFICANT CHANGE UP (ref 80–94)
METHOD TYPE: SIGNIFICANT CHANGE UP
METHOD TYPE: SIGNIFICANT CHANGE UP
MONOCYTES # BLD AUTO: 1 K/UL — HIGH (ref 0.1–0.6)
MONOCYTES NFR BLD AUTO: 13.4 % — HIGH (ref 1.7–9.3)
NEUTROPHILS # BLD AUTO: 4.04 K/UL — SIGNIFICANT CHANGE UP (ref 1.4–6.5)
NEUTROPHILS NFR BLD AUTO: 54 % — SIGNIFICANT CHANGE UP (ref 42.2–75.2)
NRBC # BLD: 0 /100 WBCS — SIGNIFICANT CHANGE UP (ref 0–0)
PLATELET # BLD AUTO: 204 K/UL — SIGNIFICANT CHANGE UP (ref 130–400)
PMV BLD: 10.1 FL — SIGNIFICANT CHANGE UP (ref 7.4–10.4)
POTASSIUM SERPL-MCNC: 4 MMOL/L — SIGNIFICANT CHANGE UP (ref 3.5–5)
POTASSIUM SERPL-SCNC: 4 MMOL/L — SIGNIFICANT CHANGE UP (ref 3.5–5)
PROT SERPL-MCNC: 6.9 G/DL — SIGNIFICANT CHANGE UP (ref 6–8)
RBC # BLD: 5.3 M/UL — SIGNIFICANT CHANGE UP (ref 4.7–6.1)
RBC # FLD: 12.7 % — SIGNIFICANT CHANGE UP (ref 11.5–14.5)
SODIUM SERPL-SCNC: 138 MMOL/L — SIGNIFICANT CHANGE UP (ref 135–146)
WBC # BLD: 7.48 K/UL — SIGNIFICANT CHANGE UP (ref 4.8–10.8)
WBC # FLD AUTO: 7.48 K/UL — SIGNIFICANT CHANGE UP (ref 4.8–10.8)

## 2025-01-05 PROCEDURE — 99233 SBSQ HOSP IP/OBS HIGH 50: CPT

## 2025-01-05 PROCEDURE — 99231 SBSQ HOSP IP/OBS SF/LOW 25: CPT | Mod: GC

## 2025-01-05 RX ADMIN — Medication 100 MILLIGRAM(S): at 13:18

## 2025-01-05 RX ADMIN — VANCOMYCIN HYDROCHLORIDE 300 MILLIGRAM(S): 5 INJECTION, POWDER, LYOPHILIZED, FOR SOLUTION INTRAVENOUS at 05:11

## 2025-01-05 RX ADMIN — Medication 100 MILLIGRAM(S): at 05:09

## 2025-01-05 RX ADMIN — Medication 100 MILLIGRAM(S): at 21:10

## 2025-01-05 RX ADMIN — VANCOMYCIN HYDROCHLORIDE 300 MILLIGRAM(S): 5 INJECTION, POWDER, LYOPHILIZED, FOR SOLUTION INTRAVENOUS at 17:43

## 2025-01-05 NOTE — PROGRESS NOTE ADULT - SUBJECTIVE AND OBJECTIVE BOX
Podiatry Progress Note    Subjective:  KATHLEEN HARVEY is a  39y Male.   Seen bedside.   Patient is a 39y old  Male who presents with a chief complaint of L heel /ankle and R heel ulcer (05 Jan 2025 13:07)      Past Medical History and Surgical History  PAST MEDICAL & SURGICAL HISTORY:       Objective:  Vital Signs Last 24 Hrs  T(C): 36.6 (05 Jan 2025 14:53), Max: 36.9 (04 Jan 2025 22:00)  T(F): 97.8 (05 Jan 2025 14:53), Max: 98.4 (04 Jan 2025 22:00)  HR: 76 (05 Jan 2025 14:53) (74 - 97)  BP: 115/75 (05 Jan 2025 14:53) (103/57 - 115/75)  BP(mean): --  RR: 18 (05 Jan 2025 14:53) (18 - 18)  SpO2: 98% (05 Jan 2025 14:53) (98% - 98%)    Parameters below as of 04 Jan 2025 22:00  Patient On (Oxygen Delivery Method): room air                            15.2   7.48  )-----------( 204      ( 05 Jan 2025 08:50 )             44.6                 01-05    138  |  102  |  11  ----------------------------<  86  4.0   |  24  |  0.8    Ca    9.0      05 Jan 2025 08:50    TPro  6.9  /  Alb  4.3  /  TBili  0.5  /  DBili  x   /  AST  12  /  ALT  11  /  AlkPhos  78  01-05          Physical Exam - Lower Extremity Focused:   Derm:   Bilateral heel ulcers probe to bone, malodor noted, necrotic tissue to left heel ulcer, fibrogranular tissue to right heel ulcer  Left lateral ankle wound with granular base and hyperkeratotic rim  Vascular: DP and PT Pulses Diminished; Foot is Warm to Warm to the touch;    MSK: paraplegic B/L LE     Assessment:  Left heel Plantar Ulcer - Probes Deep to Bone with acute sings if infection  Right heel ulcer  L ankle wound, no clinical sings of infection     Plan:  Chart reviewed and Patient evaluated. All Questions and Concerns Addressed and Answered  XR Imaging  Foot; reviewed  MRI foot/ankle results reviewed  Local Wound Care; Wound Flushed w/ Vashe; Wound Packed w/aquacel/xeroform/dsd/abd/kerlix/ace bilateral feet  Patient is wheelchair bound - limited mobility of lower extremity  Request ID Consult;  Follow Up w/ Wound Culture  Plan for OR debridement Tuesday or Wednesday of bilateral feet   Discussed Plan w/ Dr. Galvan    Podiatry

## 2025-01-05 NOTE — PROGRESS NOTE ADULT - SUBJECTIVE AND OBJECTIVE BOX
Patient is a 39y old  Male who presents with a chief complaint of L heel /ankle and R heel ulcer (04 Jan 2025 13:34)      MEDICATIONS  (STANDING):  cefepime   IVPB      cefepime   IVPB 2000 milliGRAM(s) IV Intermittent every 8 hours  enoxaparin Injectable 40 milliGRAM(s) SubCutaneous every 24 hours  nicotine -  14 mG/24Hr(s) Patch 1 Patch Transdermal daily  vancomycin  IVPB 1500 milliGRAM(s) IV Intermittent every 12 hours    MEDICATIONS  (PRN):  acetaminophen     Tablet .. 650 milliGRAM(s) Oral every 6 hours PRN Temp greater or equal to 38C (100.4F), Mild Pain (1 - 3)      CAPILLARY BLOOD GLUCOSE    I&O's Summary    04 Jan 2025 07:01  -  05 Jan 2025 07:00  --------------------------------------------------------  IN: 0 mL / OUT: 1900 mL / NET: -1900 mL        PHYSICAL EXAM:  Vital Signs Last 24 Hrs  T(C): 36.6 (05 Jan 2025 05:36), Max: 36.9 (04 Jan 2025 22:00)  T(F): 97.9 (05 Jan 2025 05:36), Max: 98.4 (04 Jan 2025 22:00)  HR: 97 (05 Jan 2025 05:36) (72 - 97)  BP: 103/57 (05 Jan 2025 05:36) (103/57 - 111/72)  BP(mean): --  RR: 18 (05 Jan 2025 05:36) (18 - 18)  SpO2: 98% (05 Jan 2025 05:36) (96% - 98%)    Parameters below as of 04 Jan 2025 22:00  Patient On (Oxygen Delivery Method): room air    GENERAL: No acute distress, well-developed  HEAD:  Atraumatic, Normocephalic  EYES: conjunctiva and sclera clear  NECK: Supple, no JVD  CHEST/LUNG: CTAB; No wheezes, rales, or rhonchi  HEART: Regular rate and rhythm; No murmurs  ABDOMEN: Soft, non-tender, non-distended  EXTREMITIES:  Left foot dressed in ace, + pain   NEUROLOGY: A&O x 3, no focal deficits  SKIN: No rashes or lesions        LABS:                        15.2   7.48  )-----------( 204      ( 05 Jan 2025 08:50 )             44.6     01-05    138  |  102  |  11  ----------------------------<  86  4.0   |  24  |  0.8    Ca    9.0      05 Jan 2025 08:50    TPro  6.9  /  Alb  4.3  /  TBili  0.5  /  DBili  x   /  AST  12  /  ALT  11  /  AlkPhos  78  01-05          Urinalysis Basic - ( 05 Jan 2025 08:50 )    Color: x / Appearance: x / SG: x / pH: x  Gluc: 86 mg/dL / Ketone: x  / Bili: x / Urobili: x   Blood: x / Protein: x / Nitrite: x   Leuk Esterase: x / RBC: x / WBC x   Sq Epi: x / Non Sq Epi: x / Bacteria: x      Culture - Abscess with Gram Stain (collected 03 Jan 2025 08:15)  Source: .Abscess  Gram Stain (04 Jan 2025 02:11):    No polymorphonuclear leukocytes seen per low power field    Numerous Gram Negative Rods seen per oil power field    Numerous Gram Positive Rods seen per oil power field    Moderate Gram positive cocci in pairs seen per oil power field  Preliminary Report (04 Jan 2025 23:19):    Numerous Pseudomonas aeruginosa    Numerous Morganella morganii    Numerous Corynebacterium striatum group    Culture - Blood (collected 02 Jan 2025 16:15)  Source: .Blood BLOOD  Preliminary Report (05 Jan 2025 01:01):    No growth at 48 Hours    Culture - Blood (collected 02 Jan 2025 16:15)  Source: .Blood BLOOD  Preliminary Report (05 Jan 2025 01:01):    No growth at 48 Hours
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
## 2025-01-06 LAB
CULTURE RESULTS: ABNORMAL
ORGANISM # SPEC MICROSCOPIC CNT: ABNORMAL
ORGANISM # SPEC MICROSCOPIC CNT: ABNORMAL
ORGANISM # SPEC MICROSCOPIC CNT: SIGNIFICANT CHANGE UP
SPECIMEN SOURCE: SIGNIFICANT CHANGE UP

## 2025-01-06 PROCEDURE — 99231 SBSQ HOSP IP/OBS SF/LOW 25: CPT | Mod: GC

## 2025-01-06 PROCEDURE — 99232 SBSQ HOSP IP/OBS MODERATE 35: CPT

## 2025-01-06 RX ORDER — MEROPENEM 1 G/20ML
1000 INJECTION, POWDER, FOR SOLUTION INTRAVENOUS EVERY 8 HOURS
Refills: 0 | Status: DISCONTINUED | OUTPATIENT
Start: 2025-01-06 | End: 2025-01-07

## 2025-01-06 RX ADMIN — VANCOMYCIN HYDROCHLORIDE 300 MILLIGRAM(S): 5 INJECTION, POWDER, LYOPHILIZED, FOR SOLUTION INTRAVENOUS at 05:31

## 2025-01-06 RX ADMIN — Medication 100 MILLIGRAM(S): at 05:05

## 2025-01-06 RX ADMIN — MEROPENEM 100 MILLIGRAM(S): 1 INJECTION, POWDER, FOR SOLUTION INTRAVENOUS at 21:50

## 2025-01-06 RX ADMIN — VANCOMYCIN HYDROCHLORIDE 300 MILLIGRAM(S): 5 INJECTION, POWDER, LYOPHILIZED, FOR SOLUTION INTRAVENOUS at 17:53

## 2025-01-06 RX ADMIN — MEROPENEM 100 MILLIGRAM(S): 1 INJECTION, POWDER, FOR SOLUTION INTRAVENOUS at 15:12

## 2025-01-06 NOTE — CHART NOTE - NSCHARTNOTEFT_GEN_A_CORE
Patient's surgery has been moved to TOMORROW TUES 1/7 - Excisional Debridement of Soft Tissue and Bone, Bilateral Foot   NPO MN TONIGHT 1/6  Obtain medical clearance please  Optimize lab values prior to OR    Thank you so much

## 2025-01-06 NOTE — PROGRESS NOTE ADULT - SUBJECTIVE AND OBJECTIVE BOX
Podiatry Progress Note    Subjective:  KATHLEEN HARVEY is a  39y Male.   Seen bedside.   Patient is a 39y old  Male who presents with a chief complaint of L heel /ankle and R heel ulcer. Podiatry has been monitoring wounds of bilateral extremities and will be taking patient to OR for debridement.       Past Medical History and Surgical History  PAST MEDICAL & SURGICAL HISTORY:       Objective:  Vital Signs Last 24 Hrs  T(C): 36.4 (06 Jan 2025 05:42), Max: 36.7 (05 Jan 2025 21:35)  T(F): 97.6 (06 Jan 2025 05:42), Max: 98 (05 Jan 2025 21:35)  HR: 79 (06 Jan 2025 05:42) (67 - 79)  BP: 94/61 (06 Jan 2025 05:42) (94/61 - 128/84)  BP(mean): --  RR: 18 (06 Jan 2025 05:42) (18 - 18)  SpO2: 99% (06 Jan 2025 05:42) (98% - 99%)                            15.2   7.48  )-----------( 204      ( 05 Jan 2025 08:50 )             44.6                 01-05    138  |  102  |  11  ----------------------------<  86  4.0   |  24  |  0.8    Ca    9.0      05 Jan 2025 08:50    TPro  6.9  /  Alb  4.3  /  TBili  0.5  /  DBili  x   /  AST  12  /  ALT  11  /  AlkPhos  78  01-05        Physical Exam - Lower Extremity Focused:   Derm:   -Wound of Left Heel, measured as 3 cm X 3.5 cm X 1.5 cm, does not show active signs of drainage or purulence with compression, agustin-wound erythema and slightl hyperkeratotic borders, malodor. Hyperkeratoses at region of Left Lateral Malleolus, with surrounding Erythema.   -Wound of Right Heel, measured as 1.5cm X 1cm X 1c, does not show active drainage or purulence with compression, extensive hyperkeratotic tissue, scaling, and erythema surrounding wound and region of Right Achilles tendon ,   -Superficial wounds and erythema present on dorsal surface of bilateral extremities.   Vascular: DP and PT Pulses Diminished; Foot is Warm to Warm to the touch;    Neuro: Protective Sensation Diminished / Moderately Neuropathic   MSK: Minimal Pain On Palpation at Wound Site, Contracted extremities b/l    Assessment:  Osteomyelitis  Superficial Wounds, bilateral extremities,   Paraplegia    Plan:  Chart reviewed and Patient evaluated. All Questions and Concerns Addressed and Answered  Local Wound Care; Wound dressed with Aquacel- Gauze- Abdominal Pad- Kerlix- Ace bandage.   Weight Bearing Status; WBAT   Continue w/ Local Wound Care; Q24 Dressing Changes;  Podiatry will be taking patient to OR, awaiting confirmation of surgery date for 01/07 or 01/08, Excisional Debridement of Soft Tissue and Bone of Bilateral Feet.   Discussed Plan w/ Attending; Dr. Galvan.     Podiatry        Podiatry Progress Note    Subjective:  KATHLEEN HARVEY is a  39y Male.   Seen bedside.   Patient is a 39y old  Male who presents with a chief complaint of L heel /ankle and R heel ulcer. Podiatry has been monitoring wounds of bilateral extremities and will be taking patient to OR for debridement.       Past Medical History and Surgical History  PAST MEDICAL & SURGICAL HISTORY:       Objective:  Vital Signs Last 24 Hrs  T(C): 36.4 (06 Jan 2025 05:42), Max: 36.7 (05 Jan 2025 21:35)  T(F): 97.6 (06 Jan 2025 05:42), Max: 98 (05 Jan 2025 21:35)  HR: 79 (06 Jan 2025 05:42) (67 - 79)  BP: 94/61 (06 Jan 2025 05:42) (94/61 - 128/84)  BP(mean): --  RR: 18 (06 Jan 2025 05:42) (18 - 18)  SpO2: 99% (06 Jan 2025 05:42) (98% - 99%)                            15.2   7.48  )-----------( 204      ( 05 Jan 2025 08:50 )             44.6                 01-05    138  |  102  |  11  ----------------------------<  86  4.0   |  24  |  0.8    Ca    9.0      05 Jan 2025 08:50    TPro  6.9  /  Alb  4.3  /  TBili  0.5  /  DBili  x   /  AST  12  /  ALT  11  /  AlkPhos  78  01-05        Physical Exam - Lower Extremity Focused:   Derm:   -Wound of Left Heel, measured as 3 cm X 3.5 cm X 1.5 cm, does not show active signs of drainage or purulence with compression, agustin-wound erythema and slightl hyperkeratotic borders, malodor. Hyperkeratoses at region of Left Lateral Malleolus, with surrounding Erythema.   -Wound of Right Heel, measured as 1.5cm X 1cm X 1cm, does not show active drainage or purulence with compression, extensive hyperkeratotic tissue, scaling, and erythema surrounding wound and region of Right Achilles tendon ,   -Superficial wounds and erythema present on dorsal surface of bilateral extremities.   Vascular: DP and PT Pulses Diminished; Foot is Warm to Warm to the touch;    Neuro: Protective Sensation Diminished / Moderately Neuropathic   MSK: Minimal Pain On Palpation at Wound Site, Contracted extremities b/l    Assessment:  Osteomyelitis  Superficial Wounds, bilateral extremities,   Paraplegia    Plan:  Chart reviewed and Patient evaluated. All Questions and Concerns Addressed and Answered  Local Wound Care; Wound dressed with Aquacel- Gauze- Abdominal Pad- Kerlix- Ace bandage.   Weight Bearing Status; WBAT   Continue w/ Local Wound Care; Q24 Dressing Changes;  Podiatry will be taking patient to OR, awaiting confirmation of surgery date for 01/07 or 01/08, Excisional Debridement of Soft Tissue and Bone of Bilateral Feet.   Discussed Plan w/ Attending; Dr. Galvan.     Podiatry

## 2025-01-06 NOTE — PROGRESS NOTE ADULT - SUBJECTIVE AND OBJECTIVE BOX
KATHLEEN HARVEY 39y Male  MRN#: [id number]   Hospital Day: 4d    SUBJECTIVE  Patient is a 39y old Male who presents with a chief complaint of L heel /ankle and R heel ulcer (06 Jan 2025 07:21)  Currently admitted to medicine with the primary diagnosis of Foot infection      INTERVAL HPI AND OVERNIGHT EVENTS:  Patient was examined and seen at bedside. This morning he is resting comfortably     OBJECTIVE  PAST MEDICAL & SURGICAL HISTORY    ALLERGIES:  No Known Allergies    MEDICATIONS:  STANDING MEDICATIONS  enoxaparin Injectable 40 milliGRAM(s) SubCutaneous every 24 hours  meropenem  IVPB 1000 milliGRAM(s) IV Intermittent every 8 hours  nicotine -  14 mG/24Hr(s) Patch 1 Patch Transdermal daily  vancomycin  IVPB 1500 milliGRAM(s) IV Intermittent every 12 hours    PRN MEDICATIONS  acetaminophen     Tablet .. 650 milliGRAM(s) Oral every 6 hours PRN      VITAL SIGNS: Last 24 Hours  T(C): 36.4 (06 Jan 2025 05:42), Max: 36.7 (05 Jan 2025 21:35)  T(F): 97.6 (06 Jan 2025 05:42), Max: 98 (05 Jan 2025 21:35)  HR: 79 (06 Jan 2025 05:42) (67 - 79)  BP: 94/61 (06 Jan 2025 05:42) (94/61 - 128/84)  BP(mean): --  RR: 18 (06 Jan 2025 05:42) (18 - 18)  SpO2: 99% (06 Jan 2025 05:42) (98% - 99%)    LABS:                        15.2   7.48  )-----------( 204      ( 05 Jan 2025 08:50 )             44.6     01-05    138  |  102  |  11  ----------------------------<  86  4.0   |  24  |  0.8    Ca    9.0      05 Jan 2025 08:50    TPro  6.9  /  Alb  4.3  /  TBili  0.5  /  DBili  x   /  AST  12  /  ALT  11  /  AlkPhos  78  01-05      Urinalysis Basic - ( 05 Jan 2025 08:50 )    Color: x / Appearance: x / SG: x / pH: x  Gluc: 86 mg/dL / Ketone: x  / Bili: x / Urobili: x   Blood: x / Protein: x / Nitrite: x   Leuk Esterase: x / RBC: x / WBC x   Sq Epi: x / Non Sq Epi: x / Bacteria: x                RADIOLOGY:      PHYSICAL EXAM:  CONSTITUTIONAL: No acute distress, well-developed, well-groomed  HEAD: Atraumatic, normocephalic  EYES:  PERRLA, conjunctiva and sclera clear  ENT: Supple, no masses, no thyromegaly, no bruits, no JVD; moist mucous membranes  PULMONARY: Clear to auscultation bilaterally; no wheezes, rales, or rhonchi  CARDIOVASCULAR: Regular rate and rhythm; no murmurs, rubs, or gallops  GASTROINTESTINAL: Soft, non-tender, non-distended; bowel sounds present  MUSCULOSKELETAL: 2+ peripheral pulses; no clubbing, no cyanosis, no edema  NEUROLOGY: non-focal  SKIN: BL HEEL LESIONS wrapped
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
## 2025-01-07 ENCOUNTER — RESULT REVIEW (OUTPATIENT)
Age: 40
End: 2025-01-07

## 2025-01-07 LAB — VANCOMYCIN TROUGH SERPL-MCNC: 14.3 UG/ML — HIGH (ref 5–10)

## 2025-01-07 PROCEDURE — 88304 TISSUE EXAM BY PATHOLOGIST: CPT | Mod: 26

## 2025-01-07 PROCEDURE — 88311 DECALCIFY TISSUE: CPT | Mod: 26

## 2025-01-07 PROCEDURE — 11044 DBRDMT BONE 1ST 20 SQ CM/<: CPT | Mod: GC

## 2025-01-07 PROCEDURE — 99232 SBSQ HOSP IP/OBS MODERATE 35: CPT

## 2025-01-07 PROCEDURE — 20225 BONE BIOPSY TROCAR/NDL DEEP: CPT | Mod: 59,GC

## 2025-01-07 PROCEDURE — 11043 DBRDMT MUSC&/FSCA 1ST 20/<: CPT | Mod: 59,GC

## 2025-01-07 PROCEDURE — 11042 DBRDMT SUBQ TIS 1ST 20SQCM/<: CPT | Mod: 59,GC

## 2025-01-07 RX ORDER — ONDANSETRON 4 MG/1
4 TABLET ORAL ONCE
Refills: 0 | Status: DISCONTINUED | OUTPATIENT
Start: 2025-01-07 | End: 2025-01-07

## 2025-01-07 RX ORDER — ACETAMINOPHEN 80 MG/.8ML
650 SOLUTION/ DROPS ORAL EVERY 6 HOURS
Refills: 0 | Status: DISCONTINUED | OUTPATIENT
Start: 2025-01-07 | End: 2025-01-13

## 2025-01-07 RX ORDER — ENOXAPARIN SODIUM 60 MG/.6ML
40 INJECTION INTRAVENOUS; SUBCUTANEOUS EVERY 24 HOURS
Refills: 0 | Status: DISCONTINUED | OUTPATIENT
Start: 2025-01-07 | End: 2025-01-13

## 2025-01-07 RX ORDER — SODIUM CHLORIDE 9 MG/ML
1000 INJECTION, SOLUTION INTRAVENOUS
Refills: 0 | Status: DISCONTINUED | OUTPATIENT
Start: 2025-01-07 | End: 2025-01-07

## 2025-01-07 RX ORDER — HYDROMORPHONE HCL 4 MG
0.5 TABLET ORAL
Refills: 0 | Status: DISCONTINUED | OUTPATIENT
Start: 2025-01-07 | End: 2025-01-07

## 2025-01-07 RX ORDER — MEROPENEM 1 G/20ML
1000 INJECTION, POWDER, FOR SOLUTION INTRAVENOUS EVERY 8 HOURS
Refills: 0 | Status: DISCONTINUED | OUTPATIENT
Start: 2025-01-07 | End: 2025-01-13

## 2025-01-07 RX ORDER — NICOTINE POLACRILEX 4 MG/1
1 LOZENGE ORAL DAILY
Refills: 0 | Status: DISCONTINUED | OUTPATIENT
Start: 2025-01-07 | End: 2025-01-13

## 2025-01-07 RX ORDER — VANCOMYCIN HYDROCHLORIDE 5 G/100ML
1250 INJECTION, POWDER, LYOPHILIZED, FOR SOLUTION INTRAVENOUS EVERY 12 HOURS
Refills: 0 | Status: DISCONTINUED | OUTPATIENT
Start: 2025-01-07 | End: 2025-01-13

## 2025-01-07 RX ADMIN — MEROPENEM 100 MILLIGRAM(S): 1 INJECTION, POWDER, FOR SOLUTION INTRAVENOUS at 21:23

## 2025-01-07 RX ADMIN — VANCOMYCIN HYDROCHLORIDE 166.67 MILLIGRAM(S): 5 INJECTION, POWDER, LYOPHILIZED, FOR SOLUTION INTRAVENOUS at 18:20

## 2025-01-07 RX ADMIN — MEROPENEM 100 MILLIGRAM(S): 1 INJECTION, POWDER, FOR SOLUTION INTRAVENOUS at 05:05

## 2025-01-07 RX ADMIN — MEROPENEM 100 MILLIGRAM(S): 1 INJECTION, POWDER, FOR SOLUTION INTRAVENOUS at 13:27

## 2025-01-07 RX ADMIN — VANCOMYCIN HYDROCHLORIDE 300 MILLIGRAM(S): 5 INJECTION, POWDER, LYOPHILIZED, FOR SOLUTION INTRAVENOUS at 05:05
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
## 2025-01-07 NOTE — BRIEF OPERATIVE NOTE - SPECIMENS
Deep wound culture, bone path, soft tissue path each for bilateral heels  Deep wound culture, bone path, soft tissue path each for bilateral heels, bone cultures B/L calcaneous

## 2025-01-07 NOTE — PROGRESS NOTE ADULT - SUBJECTIVE AND OBJECTIVE BOX
KATHLEEN HARVEY  39y, Male    LOS  5d    INTERVAL EVENTS/HPI  - No acute events overnight  - T(F): , Max: 98.2 (01-06-25 @ 14:00)  - Denies any worsening symptoms  - Tolerating medication  - WBC Count: 7.48 (01-05-25 @ 08:50)    REVIEW OF SYSTEMS:  CONSTITUTIONAL: No fever or chills  HEENT: No sore throat  RESPIRATORY: No cough, no shortness of breath  CARDIOVASCULAR: No chest pain or palpitations  GASTROINTESTINAL: No abdominal or epigastric pain  GENITOURINARY: No dysuria  NEUROLOGICAL: No headache/dizziness  MSK: No joint pain, erythema, or swelling; no back pain  SKIN: No itching, rashes  All other ROS negative except noted above    Prior hospital charts reviewed [Yes]  Primary team notes reviewed [Yes]  Other consultant notes reviewed [Yes]    ANTIMICROBIALS:   meropenem  IVPB 1000 every 8 hours  vancomycin  IVPB 1500 every 12 hours      OTHER MEDS: MEDICATIONS  (STANDING):  acetaminophen     Tablet .. 650 every 6 hours PRN  enoxaparin Injectable 40 every 24 hours      Vital Signs Last 24 Hrs  T(F): 98 (01-07-25 @ 04:32), Max: 98.4 (01-04-25 @ 22:00)    Vital Signs Last 24 Hrs  HR: 72 (01-07-25 @ 04:32) (68 - 78)  BP: 117/80 (01-07-25 @ 04:32) (117/80 - 126/80)  RR: 18 (01-07-25 @ 04:32)  SpO2: 95% (01-07-25 @ 04:32) (95% - 99%)  Wt(kg): --    EXAM:  GENERAL: NAD  HEAD: No head lesions  NECK: Supple  CHEST/LUNG: Clear to auscultation bilaterally  HEART: S1 S2  ABDOMEN: Soft, nontender  EXTREMITIES: Leg wounds noted.   NERVOUS SYSTEM: Alert and oriented to person  MSK: No joint erythema, swelling or pain  SKIN: No rashes or lesions, no superficial thrombophlebitis    Labs:            WBC Trend:  WBC Count: 7.48 (01-05-25 @ 08:50)  WBC Count: 8.34 (01-03-25 @ 08:01)  WBC Count: 8.76 (01-02-25 @ 16:15)      Creatine Trend:  Creatinine: 0.8 (01-05)  Creatinine: 0.9 (01-03)  Creatinine: 0.7 (01-02)      Liver Biochemical Testing Trend:  Alanine Aminotransferase (ALT/SGPT): 11 (01-05)  Alanine Aminotransferase (ALT/SGPT): 10 (01-03)  Alanine Aminotransferase (ALT/SGPT): 11 (01-02)  Aspartate Aminotransferase (AST/SGOT): 12 (01-05-25 @ 08:50)  Aspartate Aminotransferase (AST/SGOT): 10 (01-03-25 @ 08:01)  Aspartate Aminotransferase (AST/SGOT): 14 (01-02-25 @ 16:15)  Bilirubin Total: 0.5 (01-05)  Bilirubin Total: 0.2 (01-03)  Bilirubin Total: 0.3 (01-02)      Trend LDH          MICROBIOLOGY:  Vancomycin Level, Trough: 9.3 (01-04 @ 17:49)  Vancomycin Level, Random: 26.0 (01-04 @ 08:51)    Male    Culture - Abscess with Gram Stain (collected 03 Jan 2025 08:15)  Source: .Abscess  Final Report:    After additional incubation time, Culture yields >4 types of aerobic    and/or anaerobic bacteria    Call client services within 7 days if further workup is clinically    indicated. Culture includes    Numerous Pseudomonas aeruginosa    Numerous Morganella morganii    Numerous Corynebacterium striatum group "Susceptibilities not performed"    Moderate Bacteroides fragilis "Susceptibilities not performed"  Organism: Pseudomonas aeruginosa  Morganella morganii  Organism: Morganella morganii    Sensitivities:      Method Type: ANGELICA      -  Amoxicillin/Clavulanic Acid: R >16/8      -  Ampicillin: R >16 These ampicillin results predict results for amoxicillin      -  Ampicillin/Sulbactam: R >16/8      -  Aztreonam: I 16      -  Cefazolin: R >16      -  Cefepime: R >16      -  Cefoxitin: S <=8      -  Ceftriaxone: R >32      -  Ciprofloxacin: R >2      -  Ertapenem: S <=0.5      -  Gentamicin: R >8      -  Levofloxacin: I 1      -  Meropenem: S <=1      -  Piperacillin/Tazobactam: S <=8      -  Tobramycin: R 8      -  Trimethoprim/Sulfamethoxazole: R >2/38  Organism: Pseudomonas aeruginosa    Sensitivities:      Method Type: ANGELICA      -  Aztreonam: S <=4      -  Cefepime: S <=2      -  Ceftazidime: S 4      -  Ciprofloxacin: S <=0.25      -  Imipenem: S 2      -  Levofloxacin: S <=0.5      -  Meropenem: S <=1      -  Piperacillin/Tazobactam: S <=8    Culture - Blood (collected 02 Jan 2025 16:15)  Source: .Blood BLOOD  Preliminary Report:    No growth at 4 days    Culture - Blood (collected 02 Jan 2025 16:15)  Source: .Blood BLOOD  Preliminary Report:    No growth at 4 days  C-Reactive Protein: 32.5 (01-03)  RADIOLOGY & ADDITIONAL TESTS:  I have personally reviewed the relevant images.   CXR      CT    < from: VA Duplex Lower Extrem Arterial, Bilat (01.04.25 @ 11:08) >  INTERPRETATION:  This patient is a 39-year-old male with peripheral   arterial disease. Lower extremity arterial duplex ultrasound was   performed to assess for arterial occlusive disease.    The right common femoral, deep femoral, superficial femoral, popliteal,   anterior tibial, posterior tibial and peroneal arteries were visualized.   Diminished velocities seen in the posterior tibial artery.    The left common femoral, deep femoral, superficial femoral, popliteal,   posterior tibial and peroneal arteries were visualized. Anterior tibial   artery not visualized. Normal arterial flow in the left lower extremity.    Impression:    Diminished velocities seen in the right posterior tibial artery.    Normal arterial flow in the left lower extremity.    --- End of Report ---      < end of copied text >      WEIGHT  Weight (kg): 88.5 (01-02-25 @ 15:08)      All available historical records have been reviewed
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
## 2025-01-07 NOTE — CONSULT NOTE ADULT - SUBJECTIVE AND OBJECTIVE BOX
GENERAL SURGERY CONSULT NOTE    Patient: KATHLEEN HARVEY , 39y ([birth year])Male   MRN: [id number]  Location: [place] [address] A  Visit: 01-02-25 Inpatient  Date: 01-07-25 @ 18:30    HPI:  Pt is 39M PMHx paraplegia (16 years) c/b residual urinary and fecal incontinence (self-catheterizes), prior LE osteomyelitis (previously treated with IV abx and hyperbaric chamber, follows with wound care at [facility] in [place]) and active tobacco use who presented to the ED 1/2 for evaluation of bilateral decubitus heel ulcers. Was brought to the OR by podiatry 1/7 for debridement of bilateral decubitus heel ulcers. Vascular surgery consulted to evaluate after arterial duplex demonstrated diminished flow velocity of RLE posterior tibial artery. Pt evaluated in room after returning from PACU. Denies fevers, chills, CP, SOB, is insensate and paralyzed at baseline with sporadic BLE spasms.     VITALS:  T(F): 97.3 (01-07-25 @ 16:35), Max: 98.1 (01-06-25 @ 21:04)  HR: 67 (01-07-25 @ 16:35) (64 - 91)  BP: 108/75 (01-07-25 @ 16:35) (100/60 - 128/89)  RR: 18 (01-07-25 @ 16:01) (14 - 18)  SpO2: 99% (01-07-25 @ 16:35) (95% - 100%)    PHYSICAL EXAM:  GENERAL: A&Ox3, NAD  HEENT: Normocephalic, atraumatic  PULM: Non-labored respirations, bilateral chest rise, saturating well on RA  CV: Regular rate and rhythm  MSK: BLE wrapped with dressings from surgery with podiatry, no dressing strikethrough, proximal aspect of bilateral shins and distal toes visible, bilaterally insensate per pt baseline, DP bilaterally palpable, popliteal bilaterally palpable, unable to evaluate AT or PT due to surgical dressings  NEURO: Pt's baseline lack of sensation or voluntary motor to BLE  SKIN: Warm, dry, normal skin color, texture, turgor    MEDICATIONS  (STANDING):  enoxaparin Injectable 40 milliGRAM(s) SubCutaneous every 24 hours  meropenem  IVPB 1000 milliGRAM(s) IV Intermittent every 8 hours  nicotine -  14 mG/24Hr(s) Patch 1 Patch Transdermal daily  vancomycin  IVPB 1250 milliGRAM(s) IV Intermittent every 12 hours    MEDICATIONS  (PRN):  acetaminophen     Tablet .. 650 milliGRAM(s) Oral every 6 hours PRN Temp greater or equal to 38C (100.4F), Mild Pain (1 - 3)    IMAGING:  < from: VA Duplex Lower Extrem Arterial, Bilat (01.04.25 @ 11:08) >  ACC: [id number] EXAM:  DUPLEX LOW ARTERIES COMP BILAT   ORDERED BY:   KALYANI ANDRES     PROCEDURE DATE:  01/04/2025        INTERPRETATION:  This patient is a 39-year-old male with peripheral   arterial disease. Lower extremity arterial duplex ultrasound was   performed to assess for arterial occlusive disease.    The right common femoral, deep femoral, superficial femoral, popliteal,   anterior tibial, posterior tibial and peroneal arteries were visualized.   Diminished velocities seen in the posterior tibial artery.    The left common femoral, deep femoral, superficial femoral, popliteal,   posterior tibial and peroneal arteries were visualized. Anterior tibial   artery not visualized. Normal arterial flow in the left lower extremity.    Impression:    Diminished velocities seen in the right posterior tibial artery.    Normal arterial flow in the left lower extremity.    < end of copied text >    ASSESSMENT:  39M PMHx paraplegia (16 years) c/b residual urinary and fecal incontinence (self-catheterizes), prior LE osteomyelitis (previously treated with IV abx and hyperbaric chamber, follows with wound care at [facility]) and active tobacco use who presented to the ED 1/2 for evaluation of bilateral decubitus heel ulcers. S/p 1/7 debridement of bilateral decubitus heel ulcers by podiatry. Vascular surgery consulted to evaluate after arterial duplex demonstrated diminished flow velocity of RLE posterior tibial artery.    PLAN:  - Pending discussion with fellow   GENERAL SURGERY CONSULT NOTE    Patient: KATHLEEN HARVEY , 39y ([birth year])Male   MRN: [id number]  Location: [place] [address] A  Visit: 01-02-25 Inpatient  Date: 01-07-25 @ 18:30    HPI:  Pt is 39M PMHx paraplegia (16 years) c/b residual urinary and fecal incontinence (self-catheterizes), prior LE osteomyelitis (previously treated with IV abx and hyperbaric chamber, follows with wound care at [facility] in [place]) and active tobacco use who presented to the ED 1/2 for evaluation of bilateral decubitus heel ulcers. Was brought to the OR by podiatry 1/7 for debridement of bilateral decubitus heel ulcers. Vascular surgery consulted to evaluate after arterial duplex demonstrated diminished flow velocity of RLE posterior tibial artery. Pt evaluated in room after returning from PACU. Denies fevers, chills, CP, SOB, is insensate and paralyzed at baseline with sporadic BLE spasms.     VITALS:  T(F): 97.3 (01-07-25 @ 16:35), Max: 98.1 (01-06-25 @ 21:04)  HR: 67 (01-07-25 @ 16:35) (64 - 91)  BP: 108/75 (01-07-25 @ 16:35) (100/60 - 128/89)  RR: 18 (01-07-25 @ 16:01) (14 - 18)  SpO2: 99% (01-07-25 @ 16:35) (95% - 100%)    PHYSICAL EXAM:  GENERAL: A&Ox3, NAD  HEENT: Normocephalic, atraumatic  PULM: Non-labored respirations, bilateral chest rise, saturating well on RA  CV: Regular rate and rhythm  MSK: BLE wrapped with dressings from surgery with podiatry, no dressing strikethrough, proximal aspect of bilateral shins and distal toes visible, bilaterally insensate per pt baseline, DP bilaterally palpable, popliteal bilaterally palpable, unable to evaluate AT or PT due to surgical dressings  NEURO: Pt's baseline lack of sensation or voluntary motor to BLE  SKIN: Warm, dry, normal skin color, texture, turgor    MEDICATIONS  (STANDING):  enoxaparin Injectable 40 milliGRAM(s) SubCutaneous every 24 hours  meropenem  IVPB 1000 milliGRAM(s) IV Intermittent every 8 hours  nicotine -  14 mG/24Hr(s) Patch 1 Patch Transdermal daily  vancomycin  IVPB 1250 milliGRAM(s) IV Intermittent every 12 hours    MEDICATIONS  (PRN):  acetaminophen     Tablet .. 650 milliGRAM(s) Oral every 6 hours PRN Temp greater or equal to 38C (100.4F), Mild Pain (1 - 3)    IMAGING:  < from: VA Duplex Lower Extrem Arterial, Bilat (01.04.25 @ 11:08) >  ACC: [id number] EXAM:  DUPLEX LOW ARTERIES COMP BILAT   ORDERED BY:   KALYANI ANDRES     PROCEDURE DATE:  01/04/2025        INTERPRETATION:  This patient is a 39-year-old male with peripheral   arterial disease. Lower extremity arterial duplex ultrasound was   performed to assess for arterial occlusive disease.    The right common femoral, deep femoral, superficial femoral, popliteal,   anterior tibial, posterior tibial and peroneal arteries were visualized.   Diminished velocities seen in the posterior tibial artery.    The left common femoral, deep femoral, superficial femoral, popliteal,   posterior tibial and peroneal arteries were visualized. Anterior tibial   artery not visualized. Normal arterial flow in the left lower extremity.    Impression:    Diminished velocities seen in the right posterior tibial artery.    Normal arterial flow in the left lower extremity.    < end of copied text >    ASSESSMENT:  39M PMHx paraplegia (16 years) c/b residual urinary and fecal incontinence (self-catheterizes), prior LE osteomyelitis (previously treated with IV abx and hyperbaric chamber, follows with wound care at [facility] [place]) and active tobacco use who presented to the ED 1/2 for evaluation of bilateral decubitus heel ulcers. S/p 1/7 debridement of bilateral decubitus heel ulcers by podiatry. Vascular surgery consulted to evaluate after arterial duplex demonstrated diminished flow velocity of RLE posterior tibial artery.    PLAN:  - No acute surgical intervention indicated  - Pt can follow up outpatient with Dr. aLnza    Discussed with vascular fellow    Surgery ([id number])

## 2025-01-07 NOTE — BRIEF OPERATIVE NOTE - NSICDXBRIEFPROCEDURE_GEN_ALL_CORE_FT
PROCEDURES:  Excisional debridement of ulcer of right foot 07-Jan-2025 15:35:42 Down to and including fat and bone Grayson Garcia  Excisional debridement of ulcer of left foot 07-Jan-2025 15:35:53 Down to and including fat and bone Grayson Garcia

## 2025-01-07 NOTE — CHART NOTE - NSCHARTNOTEFT_GEN_A_CORE
PACU ANESTHESIA ADMISSION NOTE      Procedure: Excisional debridement of ulcer of right foot  Down to and including fat and bone    Excisional debridement of ulcer of left foot  Down to and including fat and bone      Post op diagnosis:  Decubitus ulcer of both heels        ____  Intubated  TV:______       Rate: ______      FiO2: ______    __x__  Patent Airway    ___x_  Full return of protective reflexes    __x__  Full recovery from anesthesia / back to baseline     Vitals:   T: 97.3           R: 14              BP: 108/69              Sat: 97%                   P: 90      Mental Status:  __x__ Awake   ___x__ Alert   _____ Drowsy   _____ Sedated    Nausea/Vomiting:  __x__ NO  ______Yes,   See Post - Op Orders          Pain Scale (0-10):  _____    Treatment: ____ None    __x__ See Post - Op/PCA Orders    Post - Operative Fluids:   ____ Oral   __x__ See Post - Op Orders    Plan: Discharge:   ____Home       __x___Floor     _____Critical Care    _____  Other:_________________

## 2025-01-07 NOTE — PROGRESS NOTE ADULT - SUBJECTIVE AND OBJECTIVE BOX
KATHLEEN HARVEY 39y Male  MRN#: [id number]     Hospital Day: 5d      SUBJECTIVE  No acute events overnight, pt seen and examined this morning.                                          ----------------------------------------------------------  OBJECTIVE  PAST MEDICAL & SURGICAL HISTORY                                            -----------------------------------------------------------  ALLERGIES:  No Known Allergies                                            ------------------------------------------------------------    HOME MEDICATIONS  Home Medications:                           MEDICATIONS:  STANDING MEDICATIONS  enoxaparin Injectable 40 milliGRAM(s) SubCutaneous every 24 hours  meropenem  IVPB 1000 milliGRAM(s) IV Intermittent every 8 hours  nicotine -  14 mG/24Hr(s) Patch 1 Patch Transdermal daily  vancomycin  IVPB 1500 milliGRAM(s) IV Intermittent every 12 hours    PRN MEDICATIONS  acetaminophen     Tablet .. 650 milliGRAM(s) Oral every 6 hours PRN                                            ------------------------------------------------------------  VITAL SIGNS: Last 24 Hours  T(C): 36.7 (07 Jan 2025 04:32), Max: 36.8 (06 Jan 2025 14:00)  T(F): 98 (07 Jan 2025 04:32), Max: 98.2 (06 Jan 2025 14:00)  HR: 72 (07 Jan 2025 04:32) (68 - 78)  BP: 117/80 (07 Jan 2025 04:32) (117/80 - 126/80)  BP(mean): --  RR: 18 (07 Jan 2025 04:32) (18 - 18)  SpO2: 95% (07 Jan 2025 04:32) (95% - 99%)      01-06-25 @ 07:01  -  01-07-25 @ 07:00  --------------------------------------------------------  IN: 0 mL / OUT: 2200 mL / NET: -2200 mL                                             --------------------------------------------------------------  LABS:                                                                    -------------------------------------------------------------  RADIOLOGY:  CXR      CT                                            --------------------------------------------------------------    PHYSICAL EXAM:  GENERAL: NAD, lying in bed comfortably  CHEST/LUNG: Clear to auscultation bilaterally; No rales, rhonchi, wheezing, or rubs. Unlabored respirations  HEART: Regular rate and rhythm; No murmurs, rubs, or gallops  ABDOMEN: Soft, nontender, nondistended  EXTREMITIES:  b/l heel wounds wrapped   NERVOUS SYSTEM:  A&Ox3

## 2025-01-07 NOTE — CONSULT NOTE ADULT - CONSULT REASON
Bilateral heel ulcers
PAD on arterial duplex, bilateral decubitus heel wounds (POD0 debridement by podiatry)
Infection
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
## 2025-01-08 LAB
ALBUMIN SERPL ELPH-MCNC: 4.1 G/DL — SIGNIFICANT CHANGE UP (ref 3.5–5.2)
ALP SERPL-CCNC: 78 U/L — SIGNIFICANT CHANGE UP (ref 30–115)
ALT FLD-CCNC: 14 U/L — SIGNIFICANT CHANGE UP (ref 0–41)
ANION GAP SERPL CALC-SCNC: 11 MMOL/L — SIGNIFICANT CHANGE UP (ref 7–14)
AST SERPL-CCNC: 15 U/L — SIGNIFICANT CHANGE UP (ref 0–41)
BASOPHILS # BLD AUTO: 0.06 K/UL — SIGNIFICANT CHANGE UP (ref 0–0.2)
BASOPHILS NFR BLD AUTO: 0.7 % — SIGNIFICANT CHANGE UP (ref 0–1)
BILIRUB SERPL-MCNC: 0.5 MG/DL — SIGNIFICANT CHANGE UP (ref 0.2–1.2)
BUN SERPL-MCNC: 16 MG/DL — SIGNIFICANT CHANGE UP (ref 10–20)
CALCIUM SERPL-MCNC: 9.3 MG/DL — SIGNIFICANT CHANGE UP (ref 8.4–10.5)
CHLORIDE SERPL-SCNC: 101 MMOL/L — SIGNIFICANT CHANGE UP (ref 98–110)
CO2 SERPL-SCNC: 25 MMOL/L — SIGNIFICANT CHANGE UP (ref 17–32)
CREAT SERPL-MCNC: 0.8 MG/DL — SIGNIFICANT CHANGE UP (ref 0.7–1.5)
CULTURE RESULTS: SIGNIFICANT CHANGE UP
CULTURE RESULTS: SIGNIFICANT CHANGE UP
EGFR: 115 ML/MIN/1.73M2 — SIGNIFICANT CHANGE UP
EOSINOPHIL # BLD AUTO: 0.28 K/UL — SIGNIFICANT CHANGE UP (ref 0–0.7)
EOSINOPHIL NFR BLD AUTO: 3.4 % — SIGNIFICANT CHANGE UP (ref 0–8)
GLUCOSE SERPL-MCNC: 104 MG/DL — HIGH (ref 70–99)
GRAM STN FLD: SIGNIFICANT CHANGE UP
GRAM STN FLD: SIGNIFICANT CHANGE UP
HCT VFR BLD CALC: 42.8 % — SIGNIFICANT CHANGE UP (ref 42–52)
HGB BLD-MCNC: 13.9 G/DL — LOW (ref 14–18)
IMM GRANULOCYTES NFR BLD AUTO: 0.5 % — HIGH (ref 0.1–0.3)
LYMPHOCYTES # BLD AUTO: 2.67 K/UL — SIGNIFICANT CHANGE UP (ref 1.2–3.4)
LYMPHOCYTES # BLD AUTO: 32.6 % — SIGNIFICANT CHANGE UP (ref 20.5–51.1)
MAGNESIUM SERPL-MCNC: 1.9 MG/DL — SIGNIFICANT CHANGE UP (ref 1.8–2.4)
MCHC RBC-ENTMCNC: 27.6 PG — SIGNIFICANT CHANGE UP (ref 27–31)
MCHC RBC-ENTMCNC: 32.5 G/DL — SIGNIFICANT CHANGE UP (ref 32–37)
MCV RBC AUTO: 85.1 FL — SIGNIFICANT CHANGE UP (ref 80–94)
MONOCYTES # BLD AUTO: 0.96 K/UL — HIGH (ref 0.1–0.6)
MONOCYTES NFR BLD AUTO: 11.7 % — HIGH (ref 1.7–9.3)
NEUTROPHILS # BLD AUTO: 4.19 K/UL — SIGNIFICANT CHANGE UP (ref 1.4–6.5)
NEUTROPHILS NFR BLD AUTO: 51.1 % — SIGNIFICANT CHANGE UP (ref 42.2–75.2)
NRBC # BLD: 0 /100 WBCS — SIGNIFICANT CHANGE UP (ref 0–0)
PLATELET # BLD AUTO: 177 K/UL — SIGNIFICANT CHANGE UP (ref 130–400)
PMV BLD: 9.8 FL — SIGNIFICANT CHANGE UP (ref 7.4–10.4)
POTASSIUM SERPL-MCNC: 4.3 MMOL/L — SIGNIFICANT CHANGE UP (ref 3.5–5)
POTASSIUM SERPL-SCNC: 4.3 MMOL/L — SIGNIFICANT CHANGE UP (ref 3.5–5)
PROT SERPL-MCNC: 7.1 G/DL — SIGNIFICANT CHANGE UP (ref 6–8)
RBC # BLD: 5.03 M/UL — SIGNIFICANT CHANGE UP (ref 4.7–6.1)
RBC # FLD: 12.7 % — SIGNIFICANT CHANGE UP (ref 11.5–14.5)
SODIUM SERPL-SCNC: 137 MMOL/L — SIGNIFICANT CHANGE UP (ref 135–146)
SPECIMEN SOURCE: SIGNIFICANT CHANGE UP
WBC # BLD: 8.2 K/UL — SIGNIFICANT CHANGE UP (ref 4.8–10.8)
WBC # FLD AUTO: 8.2 K/UL — SIGNIFICANT CHANGE UP (ref 4.8–10.8)

## 2025-01-08 PROCEDURE — 99233 SBSQ HOSP IP/OBS HIGH 50: CPT

## 2025-01-08 PROCEDURE — 99231 SBSQ HOSP IP/OBS SF/LOW 25: CPT | Mod: GC

## 2025-01-08 RX ADMIN — MEROPENEM 100 MILLIGRAM(S): 1 INJECTION, POWDER, FOR SOLUTION INTRAVENOUS at 05:09

## 2025-01-08 RX ADMIN — MEROPENEM 100 MILLIGRAM(S): 1 INJECTION, POWDER, FOR SOLUTION INTRAVENOUS at 14:53

## 2025-01-08 RX ADMIN — VANCOMYCIN HYDROCHLORIDE 166.67 MILLIGRAM(S): 5 INJECTION, POWDER, LYOPHILIZED, FOR SOLUTION INTRAVENOUS at 05:36

## 2025-01-08 RX ADMIN — MEROPENEM 100 MILLIGRAM(S): 1 INJECTION, POWDER, FOR SOLUTION INTRAVENOUS at 21:18

## 2025-01-08 RX ADMIN — VANCOMYCIN HYDROCHLORIDE 166.67 MILLIGRAM(S): 5 INJECTION, POWDER, LYOPHILIZED, FOR SOLUTION INTRAVENOUS at 17:50
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
## 2025-01-08 NOTE — PROGRESS NOTE ADULT - SUBJECTIVE AND OBJECTIVE BOX
KATHLEEN HARVEY 39y Male  MRN#: [id number]   Hospital Day: 6d    SUBJECTIVE  Patient is a 39y old Male who presents with a chief complaint of L heel /ankle and R heel ulcer (08 Jan 2025 07:39)  Currently admitted to medicine with the primary diagnosis of Foot infection      INTERVAL HPI AND OVERNIGHT EVENTS:  Patient was examined and seen at bedside. This morning he is resting comfortably in bed.    REVIEW OF SYMPTOMS:  CONSTITUTIONAL: No weakness, fevers or chills; No headaches  EYES: No visual changes, eye pain, or discharge  ENT: No vertigo; No ear pain or change in hearing; No sore throat or difficulty swallowing  NECK: No pain or stiffness  RESPIRATORY: No cough, wheezing, or hemoptysis; No shortness of breath  CARDIOVASCULAR: No chest pain or palpitations  GASTROINTESTINAL: No abdominal or epigastric pain; No nausea, vomiting, or hematemesis; No diarrhea or constipation; No melena or hematochezia  GENITOURINARY: No dysuria, frequency or hematuria  MUSCULOSKELETAL: No joint pain, no muscle pain, no weakness  NEUROLOGICAL: No numbness or weakness  SKIN: No itching or rashes    OBJECTIVE  PAST MEDICAL & SURGICAL HISTORY    ALLERGIES:  No Known Allergies    MEDICATIONS:  STANDING MEDICATIONS  enoxaparin Injectable 40 milliGRAM(s) SubCutaneous every 24 hours  meropenem  IVPB 1000 milliGRAM(s) IV Intermittent every 8 hours  nicotine -  14 mG/24Hr(s) Patch 1 Patch Transdermal daily  vancomycin  IVPB 1250 milliGRAM(s) IV Intermittent every 12 hours    PRN MEDICATIONS  acetaminophen     Tablet .. 650 milliGRAM(s) Oral every 6 hours PRN      VITAL SIGNS: Last 24 Hours  T(C): 36.7 (08 Jan 2025 05:48), Max: 36.7 (07 Jan 2025 21:14)  T(F): 98 (08 Jan 2025 05:48), Max: 98 (07 Jan 2025 21:14)  HR: 54 (08 Jan 2025 05:48) (54 - 93)  BP: 114/74 (08 Jan 2025 05:48) (100/60 - 128/89)  BP(mean): --  RR: 18 (08 Jan 2025 05:48) (14 - 18)  SpO2: 98% (08 Jan 2025 05:48) (97% - 100%)    LABS:                        13.9   8.20  )-----------( 177      ( 08 Jan 2025 07:59 )             42.8     01-08    137  |  101  |  16  ----------------------------<  104[H]  4.3   |  25  |  0.8    Ca    9.3      08 Jan 2025 07:59  Mg     1.9     01-08    TPro  7.1  /  Alb  4.1  /  TBili  0.5  /  DBili  x   /  AST  15  /  ALT  14  /  AlkPhos  78  01-08      Urinalysis Basic - ( 08 Jan 2025 07:59 )    Color: x / Appearance: x / SG: x / pH: x  Gluc: 104 mg/dL / Ketone: x  / Bili: x / Urobili: x   Blood: x / Protein: x / Nitrite: x   Leuk Esterase: x / RBC: x / WBC x   Sq Epi: x / Non Sq Epi: x / Bacteria: x      s/p 01/07, Excisional Debridement of Soft Tissue and Bone of Bilateral Heels          RADIOLOGY:  < from: VA Duplex Lower Extrem Arterial, Bilat (01.04.25 @ 11:08) >  Impression:    Diminished velocities seen in the right posterior tibial artery.    Normal arterial flow in the left lower extremity.    < end of copied text >      PHYSICAL EXAM:  CONSTITUTIONAL: No acute distress, well-developed, well-groomed, AAOx3  HEAD: Atraumatic, normocephalic  EYES: EOM intact, PERRLA, conjunctiva and sclera clear  ENT: Supple, no masses, no thyromegaly, no bruits, no JVD; moist mucous membranes  PULMONARY: Clear to auscultation bilaterally; no wheezes, rales, or rhonchi  CARDIOVASCULAR: Regular rate and rhythm; no murmurs, rubs, or gallops  GASTROINTESTINAL: Soft, non-tender, non-distended; bowel sounds present  MUSCULOSKELETAL: 2+ peripheral pulses; no clubbing, no cyanosis, no edema  NEUROLOGY: non-focal  SKIN: No rashes or lesions; warm and dry     KATHLEEN HARVEY 39y Male  MRN#: [id number]   Hospital Day: 6d    SUBJECTIVE  Patient is a 39y old Male who presents with a chief complaint of L heel /ankle and R heel ulcer (08 Jan 2025 07:39)  Currently admitted to medicine with the primary diagnosis of Foot infection      INTERVAL HPI AND OVERNIGHT EVENTS:  Patient was examined and seen at bedside. This morning he is resting comfortably in bed.    REVIEW OF SYMPTOMS:  CONSTITUTIONAL: No weakness, fevers or chills; No headaches  EYES: No visual changes, eye pain, or discharge  ENT: No vertigo; No ear pain or change in hearing; No sore throat or difficulty swallowing  NECK: No pain or stiffness  RESPIRATORY: No cough, wheezing, or hemoptysis; No shortness of breath  CARDIOVASCULAR: No chest pain or palpitations  GASTROINTESTINAL: No abdominal or epigastric pain; No nausea, vomiting, or hematemesis; No diarrhea or constipation  GENITOURINARY: No dysuria, frequency or hematuria  MUSCULOSKELETAL: No joint pain, no muscle pain, no weakness  NEUROLOGICAL: No numbness or weakness  SKIN: No itching or rashes    OBJECTIVE  PAST MEDICAL & SURGICAL HISTORY    ALLERGIES:  No Known Allergies    MEDICATIONS:  STANDING MEDICATIONS  enoxaparin Injectable 40 milliGRAM(s) SubCutaneous every 24 hours  meropenem  IVPB 1000 milliGRAM(s) IV Intermittent every 8 hours  nicotine -  14 mG/24Hr(s) Patch 1 Patch Transdermal daily  vancomycin  IVPB 1250 milliGRAM(s) IV Intermittent every 12 hours    PRN MEDICATIONS  acetaminophen     Tablet .. 650 milliGRAM(s) Oral every 6 hours PRN      VITAL SIGNS: Last 24 Hours  T(C): 36.7 (08 Jan 2025 05:48), Max: 36.7 (07 Jan 2025 21:14)  T(F): 98 (08 Jan 2025 05:48), Max: 98 (07 Jan 2025 21:14)  HR: 54 (08 Jan 2025 05:48) (54 - 93)  BP: 114/74 (08 Jan 2025 05:48) (100/60 - 128/89)  BP(mean): --  RR: 18 (08 Jan 2025 05:48) (14 - 18)  SpO2: 98% (08 Jan 2025 05:48) (97% - 100%)    LABS:                        13.9   8.20  )-----------( 177      ( 08 Jan 2025 07:59 )             42.8     01-08    137  |  101  |  16  ----------------------------<  104[H]  4.3   |  25  |  0.8    Ca    9.3      08 Jan 2025 07:59  Mg     1.9     01-08    TPro  7.1  /  Alb  4.1  /  TBili  0.5  /  DBili  x   /  AST  15  /  ALT  14  /  AlkPhos  78  01-08      Urinalysis Basic - ( 08 Jan 2025 07:59 )    Color: x / Appearance: x / SG: x / pH: x  Gluc: 104 mg/dL / Ketone: x  / Bili: x / Urobili: x   Blood: x / Protein: x / Nitrite: x   Leuk Esterase: x / RBC: x / WBC x   Sq Epi: x / Non Sq Epi: x / Bacteria: x      s/p 01/07, Excisional Debridement of Soft Tissue and Bone of Bilateral Heels          RADIOLOGY:  < from: VA Duplex Lower Extrem Arterial, Bilat (01.04.25 @ 11:08) >  Impression:    Diminished velocities seen in the right posterior tibial artery.    Normal arterial flow in the left lower extremity.    < end of copied text >      PHYSICAL EXAM:  CONSTITUTIONAL: No acute distress, well-developed, well-groomed, AAOx3  HEAD: Atraumatic, normocephalic  EYES:  PERRLA, conjunctiva and sclera clear  ENT: Supple, no masses, no thyromegaly, no bruits, no JVD; moist mucous membranes  PULMONARY: Clear to auscultation bilaterally; no wheezes, rales, or rhonchi  CARDIOVASCULAR: Regular rate and rhythm; no murmurs, rubs, or gallops  GASTROINTESTINAL: Soft, non-tender, non-distended; bowel sounds present  MUSCULOSKELETAL: 2+ peripheral pulses; no clubbing, no cyanosis, no edema  NEUROLOGY: non-focal  SKIN: feet wraped in clean dry bandage, no erythema or warmth proximally

## 2025-01-08 NOTE — PROGRESS NOTE ADULT - SUBJECTIVE AND OBJECTIVE BOX
Podiatry Progress Note    Subjective:  KATHLEEN HARVEY is a  39y Male.   Seen bedside.   Patient is a 39y old  Male who presents with a chief complaint of L heel /ankle and R heel ulcer (07 Jan 2025 18:29). Patient underwent surgery with Podiatry on 01/07, Excisional Debridement of Soft Tissue and Bone of Bilateral Heels.       Past Medical History and Surgical History  PAST MEDICAL & SURGICAL HISTORY:       Objective:  Vital Signs Last 24 Hrs  T(C): 36.7 (08 Jan 2025 05:48), Max: 36.7 (07 Jan 2025 21:14)  T(F): 98 (08 Jan 2025 05:48), Max: 98 (07 Jan 2025 21:14)  HR: 54 (08 Jan 2025 05:48) (54 - 93)  BP: 114/74 (08 Jan 2025 05:48) (100/60 - 128/89)  BP(mean): --  RR: 18 (08 Jan 2025 05:48) (14 - 18)  SpO2: 98% (08 Jan 2025 05:48) (97% - 100%)    Parameters below as of 08 Jan 2025 05:48  Patient On (Oxygen Delivery Method): room air                      Physical Exam - Lower Extremity Focused:   Derm:   -Ulcer of Left Heel measured as 4cm X 4cm X 0.1cm, no PTB, no malodor, minimal sanginous drainage, agustin-wound erythema, Minimal strikethrough detected upon removal of   -Ulcer at region of Left Lateral Malleolus, no PTB, no malodor, agustin-wound maceration, no drainage with compression   -Ulcer of Right Heel, measured as 1cm X 1.5cm X 1cm, no PTB, no malodor, agustin-wound erythema, minimal sanginous drainage, agustin-wound erythema and minimal weeping,   Vascular: DP and PT Pulses Intact; Foot is Warm to Warm to the touch; Capillary Refill Time < 3 Seconds;    Neuro: Protective Sensation Diminished / Moderately Neuropathic   MSK: Minimal Pain On Palpation at Wound Site, extremities are slightly contracted b/l    Assessment:  s/p 01/07, Excisional Debridement of Soft Tissue and Bone of Bilateral Heels    Plan:  Chart reviewed and Patient evaluated. All Questions and Concerns Addressed and Answered  Local Wound Care; Wound dressing changes of Aquacel- Gauze- Abdominal Pad- Kerlix- Ace bandage.   Weight Bearing Status; WBAT   Continue w/ Local Wound Care; Q24 Dressing Changes.   Podiatry will continue to monitor while admitted.   Discussed Plan w/ Attending; Dr. Galvan.     Podiatry        Podiatry Progress Note    Subjective:  KATHLEEN HARVEY is a  39y Male.   Seen bedside.   Patient is a 39y old  Male who presents with a chief complaint of L heel /ankle and R heel ulcer (07 Jan 2025 18:29). Patient underwent surgery with Podiatry on 01/07, Excisional Debridement of Soft Tissue and Bone of Bilateral Heels.       Past Medical History and Surgical History  PAST MEDICAL & SURGICAL HISTORY:       Objective:  Vital Signs Last 24 Hrs  T(C): 36.7 (08 Jan 2025 05:48), Max: 36.7 (07 Jan 2025 21:14)  T(F): 98 (08 Jan 2025 05:48), Max: 98 (07 Jan 2025 21:14)  HR: 54 (08 Jan 2025 05:48) (54 - 93)  BP: 114/74 (08 Jan 2025 05:48) (100/60 - 128/89)  BP(mean): --  RR: 18 (08 Jan 2025 05:48) (14 - 18)  SpO2: 98% (08 Jan 2025 05:48) (97% - 100%)    Parameters below as of 08 Jan 2025 05:48  Patient On (Oxygen Delivery Method): room air                      Physical Exam - Lower Extremity Focused:   Derm:   -Ulcer of Left Heel measured as 4cm X 4cm X 0.1cm, no PTB, no malodor, minimal sanginous drainage, agustin-wound erythema, Minimal strikethrough detected upon removal of   -Ulcer at region of Left Lateral Malleolus, no PTB, no malodor, agustin-wound maceration, no drainage with compression   -Ulcer of Right Heel, measured as 1cm X 1.5cm X 1cm, no PTB, no malodor, agustin-wound erythema, minimal sanginous drainage, agustin-wound erythema and minimal weeping,   Vascular: DP and PT Pulses Intact; Foot is Warm to Warm to the touch; Capillary Refill Time < 3 Seconds;    Neuro: Protective Sensation Diminished / Moderately Neuropathic   MSK: Minimal Pain On Palpation at Wound Site, extremities are slightly contracted b/l    Assessment:  s/p 01/07, Excisional Debridement of Soft Tissue and Bone of Bilateral Heels    Plan:  Chart reviewed and Patient evaluated. All Questions and Concerns Addressed and Answered  Local Wound Care; Wound dressing changes of Aquacel- Gauze- Abdominal Pad- Kerlix- Ace bandage.   Continue w/ Local Wound Care  Podiatry will continue to monitor while admitted.   Discussed Plan w/ Attending; Dr. Galvan.     Podiatry

## 2025-01-09 LAB
ANION GAP SERPL CALC-SCNC: 8 MMOL/L — SIGNIFICANT CHANGE UP (ref 7–14)
BUN SERPL-MCNC: 17 MG/DL — SIGNIFICANT CHANGE UP (ref 10–20)
CALCIUM SERPL-MCNC: 9.1 MG/DL — SIGNIFICANT CHANGE UP (ref 8.4–10.5)
CHLORIDE SERPL-SCNC: 103 MMOL/L — SIGNIFICANT CHANGE UP (ref 98–110)
CO2 SERPL-SCNC: 26 MMOL/L — SIGNIFICANT CHANGE UP (ref 17–32)
CREAT SERPL-MCNC: 0.8 MG/DL — SIGNIFICANT CHANGE UP (ref 0.7–1.5)
EGFR: 115 ML/MIN/1.73M2 — SIGNIFICANT CHANGE UP
GLUCOSE SERPL-MCNC: 135 MG/DL — HIGH (ref 70–99)
HCT VFR BLD CALC: 42.7 % — SIGNIFICANT CHANGE UP (ref 42–52)
HGB BLD-MCNC: 14.4 G/DL — SIGNIFICANT CHANGE UP (ref 14–18)
MCHC RBC-ENTMCNC: 28.4 PG — SIGNIFICANT CHANGE UP (ref 27–31)
MCHC RBC-ENTMCNC: 33.7 G/DL — SIGNIFICANT CHANGE UP (ref 32–37)
MCV RBC AUTO: 84.2 FL — SIGNIFICANT CHANGE UP (ref 80–94)
NRBC # BLD: 0 /100 WBCS — SIGNIFICANT CHANGE UP (ref 0–0)
PLATELET # BLD AUTO: 190 K/UL — SIGNIFICANT CHANGE UP (ref 130–400)
PMV BLD: 9.4 FL — SIGNIFICANT CHANGE UP (ref 7.4–10.4)
POTASSIUM SERPL-MCNC: 3.8 MMOL/L — SIGNIFICANT CHANGE UP (ref 3.5–5)
POTASSIUM SERPL-SCNC: 3.8 MMOL/L — SIGNIFICANT CHANGE UP (ref 3.5–5)
RBC # BLD: 5.07 M/UL — SIGNIFICANT CHANGE UP (ref 4.7–6.1)
RBC # FLD: 12.9 % — SIGNIFICANT CHANGE UP (ref 11.5–14.5)
SODIUM SERPL-SCNC: 137 MMOL/L — SIGNIFICANT CHANGE UP (ref 135–146)
WBC # BLD: 8.24 K/UL — SIGNIFICANT CHANGE UP (ref 4.8–10.8)
WBC # FLD AUTO: 8.24 K/UL — SIGNIFICANT CHANGE UP (ref 4.8–10.8)

## 2025-01-09 PROCEDURE — 99232 SBSQ HOSP IP/OBS MODERATE 35: CPT

## 2025-01-09 RX ADMIN — MEROPENEM 100 MILLIGRAM(S): 1 INJECTION, POWDER, FOR SOLUTION INTRAVENOUS at 21:36

## 2025-01-09 RX ADMIN — VANCOMYCIN HYDROCHLORIDE 166.67 MILLIGRAM(S): 5 INJECTION, POWDER, LYOPHILIZED, FOR SOLUTION INTRAVENOUS at 05:35

## 2025-01-09 RX ADMIN — VANCOMYCIN HYDROCHLORIDE 166.67 MILLIGRAM(S): 5 INJECTION, POWDER, LYOPHILIZED, FOR SOLUTION INTRAVENOUS at 18:04

## 2025-01-09 RX ADMIN — MEROPENEM 100 MILLIGRAM(S): 1 INJECTION, POWDER, FOR SOLUTION INTRAVENOUS at 05:04

## 2025-01-09 RX ADMIN — MEROPENEM 100 MILLIGRAM(S): 1 INJECTION, POWDER, FOR SOLUTION INTRAVENOUS at 15:39
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
## 2025-01-09 NOTE — PROGRESS NOTE ADULT - SUBJECTIVE AND OBJECTIVE BOX
KATHLEEN HARVEY  39y, Male    LOS  7d    INTERVAL EVENTS/HPI  - No acute events overnight  - T(F): , Max: 97.9 (01-08-25 @ 20:33)  - WBC Count: 8.24 (01-09-25 @ 08:17)  WBC Count: 8.20 (01-08-25 @ 07:59)  - Creatinine: 0.8 (01-09-25 @ 08:17)  Creatinine: 0.8 (01-08-25 @ 07:59)    REVIEW OF SYSTEMS:  CONSTITUTIONAL: No fever or chills  HEENT: No sore throat  RESPIRATORY: No cough, no shortness of breath  CARDIOVASCULAR: No chest pain or palpitations  GASTROINTESTINAL: No abdominal or epigastric pain  GENITOURINARY: No dysuria  NEUROLOGICAL: No headache/dizziness  MSK: No joint pain, erythema, or swelling; no back pain  SKIN: No itching, rashes  All other ROS negative except noted above    Prior hospital charts reviewed [Yes]  Primary team notes reviewed [Yes]  Other consultant notes reviewed [Yes]      ANTIMICROBIALS:   meropenem  IVPB 1000 every 8 hours  vancomycin  IVPB 1250 every 12 hours      OTHER MEDS: MEDICATIONS  (STANDING):  acetaminophen     Tablet .. 650 every 6 hours PRN  enoxaparin Injectable 40 every 24 hours      Vital Signs Last 24 Hrs  T(F): 97.5 (01-09-25 @ 13:42), Max: 98.4 (01-04-25 @ 22:00)    Vital Signs Last 24 Hrs  HR: 75 (01-09-25 @ 13:42) (67 - 75)  BP: 117/81 (01-09-25 @ 13:42) (97/61 - 117/81)  RR: 18 (01-09-25 @ 13:42)  SpO2: 96% (01-09-25 @ 13:42) (96% - 100%)  Wt(kg): --    EXAM:  GENERAL: NAD  HEAD: No head lesions  NECK: Supple  CHEST/LUNG: Clear to auscultation bilaterally  HEART: S1 S2  ABDOMEN: Soft, nontender  EXTREMITIES: Leg wounds noted.   NERVOUS SYSTEM: Alert and oriented to person  MSK: No joint erythema, swelling or pain  SKIN: No rashes or lesions, no superficial thrombophlebitis    Labs:                        14.4   8.24  )-----------( 190      ( 09 Jan 2025 08:17 )             42.7     01-09    137  |  103  |  17  ----------------------------<  135[H]  3.8   |  26  |  0.8    Ca    9.1      09 Jan 2025 08:17  Mg     1.9     01-08    TPro  7.1  /  Alb  4.1  /  TBili  0.5  /  DBili  x   /  AST  15  /  ALT  14  /  AlkPhos  78  01-08      WBC Trend:  WBC Count: 8.24 (01-09-25 @ 08:17)  WBC Count: 8.20 (01-08-25 @ 07:59)  WBC Count: 7.48 (01-05-25 @ 08:50)      Creatine Trend:  Creatinine: 0.8 (01-09)  Creatinine: 0.8 (01-08)  Creatinine: 0.8 (01-05)  Creatinine: 0.9 (01-03)      Liver Biochemical Testing Trend:  Alanine Aminotransferase (ALT/SGPT): 14 (01-08)  Alanine Aminotransferase (ALT/SGPT): 11 (01-05)  Alanine Aminotransferase (ALT/SGPT): 10 (01-03)  Alanine Aminotransferase (ALT/SGPT): 11 (01-02)  Aspartate Aminotransferase (AST/SGOT): 15 (01-08-25 @ 07:59)  Aspartate Aminotransferase (AST/SGOT): 12 (01-05-25 @ 08:50)  Aspartate Aminotransferase (AST/SGOT): 10 (01-03-25 @ 08:01)  Aspartate Aminotransferase (AST/SGOT): 14 (01-02-25 @ 16:15)  Bilirubin Total: 0.5 (01-08)  Bilirubin Total: 0.5 (01-05)  Bilirubin Total: 0.2 (01-03)  Bilirubin Total: 0.3 (01-02)      Trend LDH      Urinalysis Basic - ( 09 Jan 2025 08:17 )    Color: x / Appearance: x / SG: x / pH: x  Gluc: 135 mg/dL / Ketone: x  / Bili: x / Urobili: x   Blood: x / Protein: x / Nitrite: x   Leuk Esterase: x / RBC: x / WBC x   Sq Epi: x / Non Sq Epi: x / Bacteria: x        MICROBIOLOGY:  Vancomycin Level, Trough: 14.3 (01-07 @ 16:00)  Vancomycin Level, Trough: 9.3 (01-04 @ 17:49)  Vancomycin Level, Random: 26.0 (01-04 @ 08:51)    Male    Culture - Tissue with Gram Stain (collected 07 Jan 2025 15:20)  Source: Bone    Culture - Tissue with Gram Stain (collected 07 Jan 2025 15:16)  Source: Bone    Culture - Abscess with Gram Stain (collected 03 Jan 2025 08:15)  Source: .Abscess  Final Report:    After additional incubation time, Culture yields >4 types of aerobic    and/or anaerobic bacteria    Call client services within 7 days if further workup is clinically    indicated. Culture includes    Numerous Pseudomonas aeruginosa    Numerous Morganella morganii    Numerous Corynebacterium striatum group "Susceptibilities not performed"    Moderate Bacteroides fragilis "Susceptibilities not performed"  Organism: Pseudomonas aeruginosa  Morganella morganii  Organism: Morganella morganii    Sensitivities:      -  Levofloxacin: I 1      -  Tobramycin: R 8      -  Aztreonam: I 16      -  Gentamicin: R >8      -  Cefazolin: R >16      -  Cefepime: R >16      -  Piperacillin/Tazobactam: S <=8      -  Ciprofloxacin: R >2      -  Ceftriaxone: R >32      -  Ampicillin: R >16 These ampicillin results predict results for amoxicillin      Method Type: ANGELICA      -  Meropenem: S <=1      -  Ampicillin/Sulbactam: R >16/8      -  Cefoxitin: S <=8      -  Amoxicillin/Clavulanic Acid: R >16/8      -  Trimethoprim/Sulfamethoxazole: R >2/38      -  Ertapenem: S <=0.5  Organism: Pseudomonas aeruginosa    Sensitivities:      -  Levofloxacin: S <=0.5      -  Aztreonam: S <=4      -  Cefepime: S <=2      -  Piperacillin/Tazobactam: S <=8      -  Ciprofloxacin: S <=0.25      -  Imipenem: S 2      Method Type: ANGELICA      -  Meropenem: S <=1      -  Ceftazidime: S 4    Culture - Blood (collected 02 Jan 2025 16:15)  Source: .Blood BLOOD  Final Report:    No growth at 5 days    Culture - Blood (collected 02 Jan 2025 16:15)  Source: .Blood BLOOD  Final Report:    No growth at 5 days                                C-Reactive Protein: 32.5 (01-03)                    RADIOLOGY & ADDITIONAL TESTS:  I have personally reviewed the relevant images.   CXR      CT        WEIGHT  Weight (kg): 88.5 (01-07-25 @ 14:01)  Creatinine: 0.8 mg/dL (01-09-25 @ 08:17)      All available historical records have been reviewed

## 2025-01-09 NOTE — PROGRESS NOTE ADULT - TIME BILLING
On this date of service, level of risk to patient is considered: Moderate.    I have personally seen and examined this patient.    I have reviewed all pertinent clinical information and reviewed all relevant imaging and diagnostic studies personally.   I discussed recommendations with the primary team. I discussed recommendations with the primary team.
On this date of service, level of risk to patient is considered: Moderate.   I have personally seen and examined this patient.    I have reviewed all pertinent clinical information and reviewed all relevant imaging and diagnostic studies personally.   I discussed recommendations with the primary team. I discussed recommendations with the primary team.

## 2025-01-09 NOTE — PROGRESS NOTE ADULT - SUBJECTIVE AND OBJECTIVE BOX
KATHLEEN HARVEY 39y Male  MRN#: [id number]   Hospital Day: 7d    SUBJECTIVE  Patient is a 39y old Male who presents with a chief complaint of L heel /ankle and R heel ulcer (08 Jan 2025 12:35)  Currently admitted to medicine with the primary diagnosis of Foot infection      INTERVAL HPI AND OVERNIGHT EVENTS:  Patient was examined and seen at bedside. This morning he is resting comfortably in bed and reports no issues or overnight events.    REVIEW OF SYMPTOMS:  CONSTITUTIONAL: No weakness, fevers or chills; No headaches  EYES: No visual changes, eye pain, or discharge  ENT: No vertigo; No ear pain or change in hearing; No sore throat or difficulty swallowing  NECK: No pain or stiffness  RESPIRATORY: No cough, wheezing, or hemoptysis; No shortness of breath  CARDIOVASCULAR: No chest pain or palpitations  GASTROINTESTINAL: No abdominal or epigastric pain; No nausea, vomiting; No diarrhea or constipation  GENITOURINARY: No dysuria, frequency or hematuria  MUSCULOSKELETAL: No joint pain, no muscle pain  NEUROLOGICAL: No numbness   SKIN: No itching or rashes    OBJECTIVE  PAST MEDICAL & SURGICAL HISTORY    ALLERGIES:  No Known Allergies    MEDICATIONS:  STANDING MEDICATIONS  enoxaparin Injectable 40 milliGRAM(s) SubCutaneous every 24 hours  meropenem  IVPB 1000 milliGRAM(s) IV Intermittent every 8 hours  nicotine -  14 mG/24Hr(s) Patch 1 Patch Transdermal daily  vancomycin  IVPB 1250 milliGRAM(s) IV Intermittent every 12 hours    PRN MEDICATIONS  acetaminophen     Tablet .. 650 milliGRAM(s) Oral every 6 hours PRN      VITAL SIGNS: Last 24 Hours  T(C): 36.6 (09 Jan 2025 05:32), Max: 36.6 (08 Jan 2025 20:33)  T(F): 97.8 (09 Jan 2025 05:32), Max: 97.9 (08 Jan 2025 20:33)  HR: 67 (09 Jan 2025 05:32) (67 - 70)  BP: 97/61 (09 Jan 2025 05:32) (97/61 - 118/77)  BP(mean): --  RR: 18 (09 Jan 2025 05:32) (18 - 18)  SpO2: 99% (09 Jan 2025 05:32) (98% - 100%)    LABS:                        14.4   8.24  )-----------( 190      ( 09 Jan 2025 08:17 )             42.7     01-08    137  |  101  |  16  ----------------------------<  104[H]  4.3   |  25  |  0.8    Ca    9.3      08 Jan 2025 07:59  Mg     1.9     01-08    TPro  7.1  /  Alb  4.1  /  TBili  0.5  /  DBili  x   /  AST  15  /  ALT  14  /  AlkPhos  78  01-08      Urinalysis Basic - ( 08 Jan 2025 07:59 )    Color: x / Appearance: x / SG: x / pH: x  Gluc: 104 mg/dL / Ketone: x  / Bili: x / Urobili: x   Blood: x / Protein: x / Nitrite: x   Leuk Esterase: x / RBC: x / WBC x   Sq Epi: x / Non Sq Epi: x / Bacteria: x            Culture - Tissue with Gram Stain (collected 07 Jan 2025 15:20)  Source: Bone  Gram Stain (08 Jan 2025 22:26):    No polymorphonuclear leukocytes seen per low power field    No organisms seen per oil power field    Culture - Tissue with Gram Stain (collected 07 Jan 2025 15:16)  Source: Bone  Gram Stain (08 Jan 2025 22:25):    No polymorphonuclear leukocytes seen per low power field    No organisms seen per oil power field          RADIOLOGY:      PHYSICAL EXAM:  CONSTITUTIONAL: No acute distress, well-developed, well-groomed, AAOx3  HEAD: Atraumatic, normocephalic  EYES: PERRLA, conjunctiva and sclera clear  ENT: Supple, no masses, no thyromegaly, no bruits, moist mucous membranes  PULMONARY: Clear to auscultation bilaterally; no wheezes, rales, or rhonchi  CARDIOVASCULAR: Regular rate and rhythm; no murmurs, rubs, or gallops  GASTROINTESTINAL: Soft, non-tender, non-distended; bowel sounds present  MUSCULOSKELETAL: no clubbing, no cyanosis, dressing clean and dry   NEUROLOGY: non-focal  SKIN: No rashes or lesions; warm and dry

## 2025-01-10 LAB
-  AZTREONAM: SIGNIFICANT CHANGE UP
-  CEFEPIME: SIGNIFICANT CHANGE UP
-  CEFTAZIDIME: SIGNIFICANT CHANGE UP
-  CIPROFLOXACIN: SIGNIFICANT CHANGE UP
-  CLINDAMYCIN: SIGNIFICANT CHANGE UP
-  DAPTOMYCIN: SIGNIFICANT CHANGE UP
-  ERYTHROMYCIN: SIGNIFICANT CHANGE UP
-  GENTAMICIN: SIGNIFICANT CHANGE UP
-  IMIPENEM: SIGNIFICANT CHANGE UP
-  LEVOFLOXACIN: SIGNIFICANT CHANGE UP
-  LINEZOLID: SIGNIFICANT CHANGE UP
-  MEROPENEM: SIGNIFICANT CHANGE UP
-  OXACILLIN: SIGNIFICANT CHANGE UP
-  PENICILLIN: SIGNIFICANT CHANGE UP
-  PIPERACILLIN/TAZOBACTAM: SIGNIFICANT CHANGE UP
-  RIFAMPIN: SIGNIFICANT CHANGE UP
-  TETRACYCLINE: SIGNIFICANT CHANGE UP
-  TRIMETHOPRIM/SULFAMETHOXAZOLE: SIGNIFICANT CHANGE UP
-  VANCOMYCIN: SIGNIFICANT CHANGE UP
ANION GAP SERPL CALC-SCNC: 9 MMOL/L — SIGNIFICANT CHANGE UP (ref 7–14)
BUN SERPL-MCNC: 19 MG/DL — SIGNIFICANT CHANGE UP (ref 10–20)
CALCIUM SERPL-MCNC: 9.5 MG/DL — SIGNIFICANT CHANGE UP (ref 8.4–10.5)
CHLORIDE SERPL-SCNC: 101 MMOL/L — SIGNIFICANT CHANGE UP (ref 98–110)
CO2 SERPL-SCNC: 28 MMOL/L — SIGNIFICANT CHANGE UP (ref 17–32)
CREAT SERPL-MCNC: 0.9 MG/DL — SIGNIFICANT CHANGE UP (ref 0.7–1.5)
EGFR: 111 ML/MIN/1.73M2 — SIGNIFICANT CHANGE UP
GLUCOSE SERPL-MCNC: 119 MG/DL — HIGH (ref 70–99)
HCT VFR BLD CALC: 43.2 % — SIGNIFICANT CHANGE UP (ref 42–52)
HGB BLD-MCNC: 14.1 G/DL — SIGNIFICANT CHANGE UP (ref 14–18)
MCHC RBC-ENTMCNC: 27.9 PG — SIGNIFICANT CHANGE UP (ref 27–31)
MCHC RBC-ENTMCNC: 32.6 G/DL — SIGNIFICANT CHANGE UP (ref 32–37)
MCV RBC AUTO: 85.5 FL — SIGNIFICANT CHANGE UP (ref 80–94)
METHOD TYPE: SIGNIFICANT CHANGE UP
METHOD TYPE: SIGNIFICANT CHANGE UP
NRBC # BLD: 0 /100 WBCS — SIGNIFICANT CHANGE UP (ref 0–0)
PLATELET # BLD AUTO: 186 K/UL — SIGNIFICANT CHANGE UP (ref 130–400)
PMV BLD: 10 FL — SIGNIFICANT CHANGE UP (ref 7.4–10.4)
POTASSIUM SERPL-MCNC: 4.3 MMOL/L — SIGNIFICANT CHANGE UP (ref 3.5–5)
POTASSIUM SERPL-SCNC: 4.3 MMOL/L — SIGNIFICANT CHANGE UP (ref 3.5–5)
RBC # BLD: 5.05 M/UL — SIGNIFICANT CHANGE UP (ref 4.7–6.1)
RBC # FLD: 12.9 % — SIGNIFICANT CHANGE UP (ref 11.5–14.5)
SODIUM SERPL-SCNC: 138 MMOL/L — SIGNIFICANT CHANGE UP (ref 135–146)
SURGICAL PATHOLOGY STUDY: SIGNIFICANT CHANGE UP
WBC # BLD: 8.26 K/UL — SIGNIFICANT CHANGE UP (ref 4.8–10.8)
WBC # FLD AUTO: 8.26 K/UL — SIGNIFICANT CHANGE UP (ref 4.8–10.8)

## 2025-01-10 PROCEDURE — 99231 SBSQ HOSP IP/OBS SF/LOW 25: CPT | Mod: GC

## 2025-01-10 PROCEDURE — 99232 SBSQ HOSP IP/OBS MODERATE 35: CPT

## 2025-01-10 RX ORDER — CHLORHEXIDINE GLUCONATE 1.2 MG/ML
1 RINSE ORAL
Refills: 0 | Status: DISCONTINUED | OUTPATIENT
Start: 2025-01-10 | End: 2025-01-13

## 2025-01-10 RX ORDER — GUAIFENESIN/DEXTROMETHORPHAN 200-10MG/5
10 LIQUID (ML) ORAL ONCE
Refills: 0 | Status: COMPLETED | OUTPATIENT
Start: 2025-01-10 | End: 2025-01-10

## 2025-01-10 RX ADMIN — MEROPENEM 100 MILLIGRAM(S): 1 INJECTION, POWDER, FOR SOLUTION INTRAVENOUS at 14:14

## 2025-01-10 RX ADMIN — MEROPENEM 100 MILLIGRAM(S): 1 INJECTION, POWDER, FOR SOLUTION INTRAVENOUS at 21:00

## 2025-01-10 RX ADMIN — Medication 10 MILLILITER(S): at 08:54

## 2025-01-10 RX ADMIN — VANCOMYCIN HYDROCHLORIDE 166.67 MILLIGRAM(S): 5 INJECTION, POWDER, LYOPHILIZED, FOR SOLUTION INTRAVENOUS at 17:21

## 2025-01-10 RX ADMIN — VANCOMYCIN HYDROCHLORIDE 166.67 MILLIGRAM(S): 5 INJECTION, POWDER, LYOPHILIZED, FOR SOLUTION INTRAVENOUS at 05:18

## 2025-01-10 RX ADMIN — MEROPENEM 100 MILLIGRAM(S): 1 INJECTION, POWDER, FOR SOLUTION INTRAVENOUS at 05:17

## 2025-01-10 RX ADMIN — ENOXAPARIN SODIUM 40 MILLIGRAM(S): 60 INJECTION INTRAVENOUS; SUBCUTANEOUS at 14:16

## 2025-01-10 NOTE — PROGRESS NOTE ADULT - SUBJECTIVE AND OBJECTIVE BOX
· Procedure Name	PICC Line Insertion  · TIME OUT	Patient's first and last name, , procedure, and correct site confirmed prior to the start of procedure.  · Procedure Date	1-   · Informed Consent	Benefits, risks, and possible complications of procedure explained to patient/caregiver who verbalized understanding and gave written consent., patient  · Procedure Performed By	Attending, Dr. Joseph  · Indications	antibiotic therapy  · Site Prep	chlorhexidine  · Anatomic Location	left; basilic vein  · Patient Position	supine  · Procedural Sedation Used	No  · Local Anesthesia	1% lidocaine  · Procedure Details	location identified, draped/prepped, sterile technique used; sterile dressing applied; sterile technique, catheter placed; supine position; ultrasound guidance  · Catheter Size (Fr)	5  · Number of Lumens	single lumen  · Power injectable	Yes  · Number of Attempts	1  · Secured By	sutures  · Post-Procedure Radiography	chest radiograph, depth of insertion, fluoroscopy, line adjusted to depth of insertion, line in appropriate position  · Tolerance	Patient tolerated procedure well.  · Complications	no complications  · Estimated Blood Loss	Minimal  · Post-Procedure Care Guidelines	Verbal/written post procedure instructions were given to patient/caregiver, Instructed patient/caregiver to follow-up with primary care physician, Instructed patient/caregiver regarding signs and symptoms of infection, Keep the cast/splint/dressing clean and dry, Care for catheter as per unit/ICU protocols  · Additional Procedure Details	Bard PowerPICC used  Tip in SVC, 44 cm, ok for immediate use
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
## 2025-01-10 NOTE — PROGRESS NOTE ADULT - SUBJECTIVE AND OBJECTIVE BOX
Podiatry Progress Note    Subjective:  KATHLEEN HARVEY is a  39y Male.   Seen bedside.   Patient is a 39y old  Male who presents with a chief complaint of L heel /ankle and R heel ulcer (09 Jan 2025 14:48). Patient underwent surgery with Podiatry on 01/07, Excisional Debridement of Soft Tissue and Bone of Bilateral Heels.           Past Medical History and Surgical History  PAST MEDICAL & SURGICAL HISTORY:       Objective:  Vital Signs Last 24 Hrs  T(C): 36.6 (10 Michelet 2025 04:39), Max: 36.8 (09 Jan 2025 21:15)  T(F): 97.9 (10 Michelet 2025 04:39), Max: 98.2 (09 Jan 2025 21:15)  HR: 53 (10 Michelet 2025 04:39) (53 - 75)  BP: 97/60 (10 Michelet 2025 04:39) (97/60 - 117/81)  BP(mean): --  RR: 18 (10 Michelet 2025 04:39) (18 - 18)  SpO2: 99% (10 Michelet 2025 04:39) (96% - 99%)    Parameters below as of 10 Michelet 2025 04:39  Patient On (Oxygen Delivery Method): room air                            14.4   8.24  )-----------( 190      ( 09 Jan 2025 08:17 )             42.7                 01-09    137  |  103  |  17  ----------------------------<  135[H]  3.8   |  26  |  0.8    Ca    9.1      09 Jan 2025 08:17  Mg     1.9     01-08    TPro  7.1  /  Alb  4.1  /  TBili  0.5  /  DBili  x   /  AST  15  /  ALT  14  /  AlkPhos  78  01-08        Physical Exam - Lower Extremity Focused:   Derm:   -Ulcer of b/l Heels display minimal sanginous drainage, agustin-wound erythema, not PTB, no malodor  -Ulcer at region of Left Lateral Malleolus, no drainage with compression  Vascular: DP and PT Pulses Intact; Foot is Warm to Warm to the touch;   Neuro: Protective Sensation Diminished / Moderately Neuropathic   MSK: Minimal Pain On Palpation at Wound Site, extremities are slightly contracted b/l      Assessment:  s/p 01/07, Excisional Debridement of Soft Tissue and Bone of Bilateral Heels  paraplegia      Plan:  Chart reviewed and Patient evaluated. All Questions and Concerns Addressed and Answered  Local Wound Care; Wound dressing changes of Aquacel- Gauze- Abdominal Pad- Kerlix.  Weight Bearing Status; WBAT   Continue w/ Local Wound Care; Q48 Dressing Changes.   Podiatry will continue to monitor while admitted.   Discussed Plan w/ Attending; Dr. Galvan.     Podiatry            Podiatry Progress Note    Subjective:  KATHLEEN HARVEY is a  39y Male.   Seen bedside.   Patient is a 39y old  Male who presents with a chief complaint of L heel /ankle and R heel ulcer (09 Jan 2025 14:48). Patient underwent surgery with Podiatry on 01/07, Excisional Debridement of Soft Tissue and Bone of Bilateral Heels.           Past Medical History and Surgical History  PAST MEDICAL & SURGICAL HISTORY:       Objective:  Vital Signs Last 24 Hrs  T(C): 36.6 (10 Michelet 2025 04:39), Max: 36.8 (09 Jan 2025 21:15)  T(F): 97.9 (10 Michelet 2025 04:39), Max: 98.2 (09 Jan 2025 21:15)  HR: 53 (10 Michelet 2025 04:39) (53 - 75)  BP: 97/60 (10 Michelet 2025 04:39) (97/60 - 117/81)  BP(mean): --  RR: 18 (10 Michelet 2025 04:39) (18 - 18)  SpO2: 99% (10 Michelet 2025 04:39) (96% - 99%)    Parameters below as of 10 Michelet 2025 04:39  Patient On (Oxygen Delivery Method): room air                            14.4   8.24  )-----------( 190      ( 09 Jan 2025 08:17 )             42.7                 01-09    137  |  103  |  17  ----------------------------<  135[H]  3.8   |  26  |  0.8    Ca    9.1      09 Jan 2025 08:17  Mg     1.9     01-08    TPro  7.1  /  Alb  4.1  /  TBili  0.5  /  DBili  x   /  AST  15  /  ALT  14  /  AlkPhos  78  01-08        Physical Exam - Lower Extremity Focused:   Derm:   -Ulcer of b/l Heels display minimal sanginous drainage, agustin-wound erythema, not PTB, no malodor  -Ulcer at region of Left Lateral Malleolus, no drainage with compression  Vascular: DP and PT Pulses Intact; Foot is Warm to Warm to the touch;   Neuro: Protective Sensation Diminished / Moderately Neuropathic   MSK: Minimal Pain On Palpation at Wound Site, extremities are slightly contracted b/l      Assessment:  s/p 01/07, Excisional Debridement of Soft Tissue and Bone of Bilateral Heels  paraplegia      Plan:  Chart reviewed and Patient evaluated. All Questions and Concerns Addressed and Answered  Local Wound Care; Wound dressing changes of Aquacel- Gauze- Abdominal Pad- Kerlix.  Continue w/ Local Wound Care; Q48 Dressing Changes.   Podiatry will continue to monitor while admitted.   Discussed Plan w/ Attending; Dr. Galvan.     Podiatry

## 2025-01-10 NOTE — PROGRESS NOTE ADULT - SUBJECTIVE AND OBJECTIVE BOX
KATHLEEN HARVEY 39y Male  MRN#: [id number]   Hospital Day: 8d    SUBJECTIVE  Patient is a 39y old Male who presents with a chief complaint of L heel /ankle and R heel ulcer (10 Michelet 2025 07:31)  Currently admitted to medicine with the primary diagnosis of Foot infection      INTERVAL HPI AND OVERNIGHT EVENTS:  Patient was examined and seen at bedside. This morning he is resting comfortably in bed and reports no issues or overnight events.    REVIEW OF SYMPTOMS:  CONSTITUTIONAL: No weakness, fevers or chills; No headaches  EYES: No visual changes, eye pain, or discharge  ENT: No vertigo; No ear pain or change in hearing; No sore throat or difficulty swallowing  NECK: No pain or stiffness  RESPIRATORY: No cough, wheezing, or hemoptysis; No shortness of breath  CARDIOVASCULAR: No chest pain or palpitations  GASTROINTESTINAL: No abdominal or epigastric pain; No nausea, vomiting, No diarrhea or constipation  GENITOURINARY: No dysuria, frequency or hematuria  MUSCULOSKELETAL: No joint pain, no muscle pain, no weakness  NEUROLOGICAL: No numbness or weakness  SKIN: No itching or rashes    OBJECTIVE  PAST MEDICAL & SURGICAL HISTORY    ALLERGIES:  No Known Allergies    MEDICATIONS:  STANDING MEDICATIONS  chlorhexidine 2% Cloths 1 Application(s) Topical <User Schedule>  enoxaparin Injectable 40 milliGRAM(s) SubCutaneous every 24 hours  meropenem  IVPB 1000 milliGRAM(s) IV Intermittent every 8 hours  nicotine -  14 mG/24Hr(s) Patch 1 Patch Transdermal daily  vancomycin  IVPB 1250 milliGRAM(s) IV Intermittent every 12 hours    PRN MEDICATIONS  acetaminophen     Tablet .. 650 milliGRAM(s) Oral every 6 hours PRN      VITAL SIGNS: Last 24 Hours  T(C): 36.5 (10 Michelet 2025 14:07), Max: 36.8 (09 Jan 2025 21:15)  T(F): 97.7 (10 Michelet 2025 14:07), Max: 98.2 (09 Jan 2025 21:15)  HR: 62 (10 Michelet 2025 14:07) (53 - 64)  BP: 109/71 (10 Michelet 2025 14:07) (97/60 - 109/71)  BP(mean): --  RR: 18 (10 Michelet 2025 14:07) (18 - 18)  SpO2: 99% (10 Michelet 2025 04:39) (97% - 99%)    LABS:                        14.1   8.26  )-----------( 186      ( 10 Michelet 2025 06:26 )             43.2     01-10    138  |  101  |  19  ----------------------------<  119[H]  4.3   |  28  |  0.9    Ca    9.5      10 Michelet 2025 06:26        Urinalysis Basic - ( 10 Michelet 2025 06:26 )    Color: x / Appearance: x / SG: x / pH: x  Gluc: 119 mg/dL / Ketone: x  / Bili: x / Urobili: x   Blood: x / Protein: x / Nitrite: x   Leuk Esterase: x / RBC: x / WBC x   Sq Epi: x / Non Sq Epi: x / Bacteria: x                RADIOLOGY:      PHYSICAL EXAM:  CONSTITUTIONAL: No acute distress, well-developed, well-groomed, AAOx3  HEAD: Atraumatic, normocephalic  EYES:  PERRLA, conjunctiva and sclera clear  ENT: Supple, no masses, no thyromegaly, no bruits, no JVD  PULMONARY: Clear to auscultation bilaterally; no wheezes, rales, or rhonchi  CARDIOVASCULAR: Regular rate and rhythm; no murmurs, rubs, or gallops  GASTROINTESTINAL: Soft, non-tender, non-distended  MUSCULOSKELETAL: 2+ peripheral pulses; no clubbing, no cyanosis  NEUROLOGY: non-focal  SKIN: No rashes or lesions; warm and dry

## 2025-01-11 ENCOUNTER — TRANSCRIPTION ENCOUNTER (OUTPATIENT)
Age: 40
End: 2025-01-11

## 2025-01-11 LAB
ANION GAP SERPL CALC-SCNC: 10 MMOL/L — SIGNIFICANT CHANGE UP (ref 7–14)
BUN SERPL-MCNC: 17 MG/DL — SIGNIFICANT CHANGE UP (ref 10–20)
CALCIUM SERPL-MCNC: 9.2 MG/DL — SIGNIFICANT CHANGE UP (ref 8.4–10.5)
CHLORIDE SERPL-SCNC: 104 MMOL/L — SIGNIFICANT CHANGE UP (ref 98–110)
CK SERPL-CCNC: 41 U/L — SIGNIFICANT CHANGE UP (ref 0–225)
CO2 SERPL-SCNC: 25 MMOL/L — SIGNIFICANT CHANGE UP (ref 17–32)
CREAT SERPL-MCNC: 0.8 MG/DL — SIGNIFICANT CHANGE UP (ref 0.7–1.5)
EGFR: 115 ML/MIN/1.73M2 — SIGNIFICANT CHANGE UP
GLUCOSE SERPL-MCNC: 135 MG/DL — HIGH (ref 70–99)
HCT VFR BLD CALC: 43.7 % — SIGNIFICANT CHANGE UP (ref 42–52)
HGB BLD-MCNC: 14.7 G/DL — SIGNIFICANT CHANGE UP (ref 14–18)
MCHC RBC-ENTMCNC: 28.7 PG — SIGNIFICANT CHANGE UP (ref 27–31)
MCHC RBC-ENTMCNC: 33.6 G/DL — SIGNIFICANT CHANGE UP (ref 32–37)
MCV RBC AUTO: 85.2 FL — SIGNIFICANT CHANGE UP (ref 80–94)
NRBC # BLD: 0 /100 WBCS — SIGNIFICANT CHANGE UP (ref 0–0)
PLATELET # BLD AUTO: 195 K/UL — SIGNIFICANT CHANGE UP (ref 130–400)
PMV BLD: 10.1 FL — SIGNIFICANT CHANGE UP (ref 7.4–10.4)
POTASSIUM SERPL-MCNC: 4.2 MMOL/L — SIGNIFICANT CHANGE UP (ref 3.5–5)
POTASSIUM SERPL-SCNC: 4.2 MMOL/L — SIGNIFICANT CHANGE UP (ref 3.5–5)
RBC # BLD: 5.13 M/UL — SIGNIFICANT CHANGE UP (ref 4.7–6.1)
RBC # FLD: 12.8 % — SIGNIFICANT CHANGE UP (ref 11.5–14.5)
SODIUM SERPL-SCNC: 139 MMOL/L — SIGNIFICANT CHANGE UP (ref 135–146)
WBC # BLD: 8.84 K/UL — SIGNIFICANT CHANGE UP (ref 4.8–10.8)
WBC # FLD AUTO: 8.84 K/UL — SIGNIFICANT CHANGE UP (ref 4.8–10.8)

## 2025-01-11 PROCEDURE — 99232 SBSQ HOSP IP/OBS MODERATE 35: CPT

## 2025-01-11 RX ADMIN — VANCOMYCIN HYDROCHLORIDE 166.67 MILLIGRAM(S): 5 INJECTION, POWDER, LYOPHILIZED, FOR SOLUTION INTRAVENOUS at 17:21

## 2025-01-11 RX ADMIN — MEROPENEM 100 MILLIGRAM(S): 1 INJECTION, POWDER, FOR SOLUTION INTRAVENOUS at 13:35

## 2025-01-11 RX ADMIN — MEROPENEM 100 MILLIGRAM(S): 1 INJECTION, POWDER, FOR SOLUTION INTRAVENOUS at 21:16

## 2025-01-11 RX ADMIN — CHLORHEXIDINE GLUCONATE 1 APPLICATION(S): 1.2 RINSE ORAL at 05:45

## 2025-01-11 RX ADMIN — MEROPENEM 100 MILLIGRAM(S): 1 INJECTION, POWDER, FOR SOLUTION INTRAVENOUS at 05:04

## 2025-01-11 RX ADMIN — VANCOMYCIN HYDROCHLORIDE 166.67 MILLIGRAM(S): 5 INJECTION, POWDER, LYOPHILIZED, FOR SOLUTION INTRAVENOUS at 05:30

## 2025-01-11 NOTE — DISCHARGE NOTE PROVIDER - NSDCCPCAREPLAN_GEN_ALL_CORE_FT
PRINCIPAL DISCHARGE DIAGNOSIS  Diagnosis: Foot infection  Assessment and Plan of Treatment: - continue IV antibiotics via PICC line as directed  - Weekly CBC, CMP, ESR/CRP, CPK  -ID follow-up with Dr. Federico Mendoza for Telehealth. Team will call the patient between 10:30-1:30      [address]       [phone]       Fax [phone]  - local wound care: Wound dressing changes of Aquacel- Gauze- Abdominal Pad- Kerlix q 48 h  - follow up with Dr Galvan in 1 week for reassessment

## 2025-01-11 NOTE — PROGRESS NOTE ADULT - SUBJECTIVE AND OBJECTIVE BOX
KATHLEEN HARVEY 39y Male  MRN#: [id number]   Hospital Day: 9d    SUBJECTIVE  Patient is a 39y old Male who presents with a chief complaint of L heel /ankle and R heel ulcer (11 Jan 2025 10:26)  Currently admitted to medicine with the primary diagnosis of Foot infection      INTERVAL HPI AND OVERNIGHT EVENTS:  Patient was examined and seen at bedside. This morning he is resting comfortably in bed and reports no issues or overnight events.      OBJECTIVE  PAST MEDICAL & SURGICAL HISTORY    ALLERGIES:  No Known Allergies    MEDICATIONS:  STANDING MEDICATIONS  chlorhexidine 2% Cloths 1 Application(s) Topical <User Schedule>  enoxaparin Injectable 40 milliGRAM(s) SubCutaneous every 24 hours  meropenem  IVPB 1000 milliGRAM(s) IV Intermittent every 8 hours  nicotine -  14 mG/24Hr(s) Patch 1 Patch Transdermal daily  vancomycin  IVPB 1250 milliGRAM(s) IV Intermittent every 12 hours    PRN MEDICATIONS  acetaminophen     Tablet .. 650 milliGRAM(s) Oral every 6 hours PRN      VITAL SIGNS: Last 24 Hours  T(C): 36.4 (11 Jan 2025 14:04), Max: 36.6 (10 Michelet 2025 21:12)  T(F): 97.5 (11 Jan 2025 14:04), Max: 97.8 (10 Michelet 2025 21:12)  HR: 71 (11 Jan 2025 14:04) (71 - 79)  BP: 108/69 (11 Jan 2025 14:04) (97/66 - 108/69)  BP(mean): --  RR: 18 (11 Jan 2025 14:04) (16 - 18)  SpO2: 99% (11 Jan 2025 14:04) (99% - 100%)    LABS:                        14.7   8.84  )-----------( 195      ( 11 Jan 2025 06:44 )             43.7     01-11    139  |  104  |  17  ----------------------------<  135[H]  4.2   |  25  |  0.8    Ca    9.2      11 Jan 2025 06:44        Urinalysis Basic - ( 11 Jan 2025 06:44 )    Color: x / Appearance: x / SG: x / pH: x  Gluc: 135 mg/dL / Ketone: x  / Bili: x / Urobili: x   Blood: x / Protein: x / Nitrite: x   Leuk Esterase: x / RBC: x / WBC x   Sq Epi: x / Non Sq Epi: x / Bacteria: x                RADIOLOGY:      PHYSICAL EXAM:  CONSTITUTIONAL: No acute distress, well-developed, well-groomed, AAOx3  HEAD: Atraumatic, normocephalic  EYES: EOM intact, PERRLA, conjunctiva and sclera clear  ENT: Supple, no masses, no thyromegaly, no bruits, no JVD; moist mucous membranes  PULMONARY: Clear to auscultation bilaterally; no wheezes, rales, or rhonchi  CARDIOVASCULAR: Regular rate and rhythm; no murmurs, rubs, or gallops  GASTROINTESTINAL: Soft, non-tender, non-distended; bowel sounds present  MUSCULOSKELETAL: no clubbing, no cyanosis, no edema  NEUROLOGY: non-focal  SKIN: No rashes or lesions; warm and dry
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
## 2025-01-11 NOTE — DISCHARGE NOTE PROVIDER - CARE PROVIDERS DIRECT ADDRESSES
[email or web address]

## 2025-01-11 NOTE — DISCHARGE NOTE PROVIDER - NS AS DC PROVIDER CONTACT Y/N MULTI
Patient called and left a voice message for Dr. Reis to order a covid test for her. Patient says that she is having symptoms. Please call patient at [phone].   Please triage.    Yes

## 2025-01-11 NOTE — DISCHARGE NOTE PROVIDER - PROVIDER TOKENS
PROVIDER:[TOKEN:[[id number]],FOLLOWUP:[1 week]],PROVIDER:[TOKEN:[[id number]],FOLLOWUP:[1 week]]
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
## 2025-01-11 NOTE — DISCHARGE NOTE PROVIDER - ATTENDING DISCHARGE PHYSICAL EXAMINATION:
CONSTITUTIONAL: No acute distress, well-developed, well-groomed, AAOx3  HEAD: Atraumatic, normocephalic  EYES: PERRLA, conjunctiva and sclera clear  ENT: Supple, no masses, no thyromegaly, no bruits, moist mucous membranes  PULMONARY: Clear to auscultation bilaterally; no wheezes, rales, or rhonchi  CARDIOVASCULAR: Regular rate and rhythm; no murmurs, rubs, or gallops  GASTROINTESTINAL: Soft, non-tender, non-distended; bowel sounds present  MUSCULOSKELETAL: no clubbing, no cyanosis, dressing clean and dry   NEUROLOGY: non-focal  SKIN: No rashes or lesions; warm and dry

## 2025-01-11 NOTE — DISCHARGE NOTE PROVIDER - CARE PROVIDER_API CALL
Ratna Galvan  Podiatric Medicine and Surgery  [address] [place] [address]  Phone: [phone]  Fax: [phone]  Follow Up Time: 1 week    Federico Montenegro  Infectious Disease  [address] [place] [address]  Phone: [phone]  Fax: [phone]  Follow Up Time: 1 week

## 2025-01-11 NOTE — DISCHARGE NOTE PROVIDER - NSDCMRMEDTOKEN_GEN_ALL_CORE_FT
DAPTOmycin 350 mg/50 mL-NaCl 0.9% intravenous solution: 50 milliliter(s) intravenously once a day  DAPTOmycin 500 mg/50 mL-NaCl 0.9% intravenous solution: 50 milliliter(s) intravenously once a day  meropenem 1000 mg intravenous injection: 1,000 milligram(s) intravenously every 8 hours MDD: 3000

## 2025-01-12 LAB — VANCOMYCIN TROUGH SERPL-MCNC: 21.1 UG/ML — HIGH (ref 5–10)

## 2025-01-12 PROCEDURE — 99232 SBSQ HOSP IP/OBS MODERATE 35: CPT

## 2025-01-12 PROCEDURE — 99231 SBSQ HOSP IP/OBS SF/LOW 25: CPT | Mod: GC

## 2025-01-12 RX ADMIN — MEROPENEM 100 MILLIGRAM(S): 1 INJECTION, POWDER, FOR SOLUTION INTRAVENOUS at 22:16

## 2025-01-12 RX ADMIN — VANCOMYCIN HYDROCHLORIDE 166.67 MILLIGRAM(S): 5 INJECTION, POWDER, LYOPHILIZED, FOR SOLUTION INTRAVENOUS at 17:40

## 2025-01-12 RX ADMIN — VANCOMYCIN HYDROCHLORIDE 166.67 MILLIGRAM(S): 5 INJECTION, POWDER, LYOPHILIZED, FOR SOLUTION INTRAVENOUS at 05:50

## 2025-01-12 RX ADMIN — MEROPENEM 100 MILLIGRAM(S): 1 INJECTION, POWDER, FOR SOLUTION INTRAVENOUS at 15:42

## 2025-01-12 RX ADMIN — MEROPENEM 100 MILLIGRAM(S): 1 INJECTION, POWDER, FOR SOLUTION INTRAVENOUS at 05:17

## 2025-01-12 RX ADMIN — CHLORHEXIDINE GLUCONATE 1 APPLICATION(S): 1.2 RINSE ORAL at 05:34

## 2025-01-12 NOTE — PROGRESS NOTE ADULT - SUBJECTIVE AND OBJECTIVE BOX
KATHLEEN HARVEY 39y Male  MRN#: [id number]   Hospital Day: 10d    SUBJECTIVE  Patient is a 39y old Male who presents with a chief complaint of L heel /ankle and R heel ulcer (11 Jan 2025 19:16)  Currently admitted to medicine with the primary diagnosis of Foot infection      INTERVAL HPI AND OVERNIGHT EVENTS:  Patient was examined and seen at bedside. This morning he is resting comfortably in bed and reports no issues or overnight events.    REVIEW OF SYMPTOMS:  CONSTITUTIONAL: No weakness, fevers or chills; No headaches  EYES: No visual changes, eye pain, or discharge  ENT: No vertigo; No ear pain or change in hearing; No sore throat or difficulty swallowing  NECK: No pain or stiffness  RESPIRATORY: No cough, wheezing, or hemoptysis; No shortness of breath  CARDIOVASCULAR: No chest pain or palpitations  GASTROINTESTINAL: No abdominal or epigastric pain; No nausea, vomiting, or hematemesis; No diarrhea or constipation; No melena or hematochezia  GENITOURINARY: No dysuria, frequency or hematuria  MUSCULOSKELETAL: No joint pain, no muscle pain, no weakness  NEUROLOGICAL: No numbness or weakness  SKIN: No itching or rashes    OBJECTIVE  PAST MEDICAL & SURGICAL HISTORY    ALLERGIES:  No Known Allergies    MEDICATIONS:  STANDING MEDICATIONS  chlorhexidine 2% Cloths 1 Application(s) Topical <User Schedule>  enoxaparin Injectable 40 milliGRAM(s) SubCutaneous every 24 hours  meropenem  IVPB 1000 milliGRAM(s) IV Intermittent every 8 hours  nicotine -  14 mG/24Hr(s) Patch 1 Patch Transdermal daily  vancomycin  IVPB 1250 milliGRAM(s) IV Intermittent every 12 hours    PRN MEDICATIONS  acetaminophen     Tablet .. 650 milliGRAM(s) Oral every 6 hours PRN      VITAL SIGNS: Last 24 Hours  T(C): 36.7 (12 Jan 2025 04:40), Max: 36.7 (12 Jan 2025 04:40)  T(F): 98 (12 Jan 2025 04:40), Max: 98 (12 Jan 2025 04:40)  HR: 72 (12 Jan 2025 04:40) (69 - 72)  BP: 108/66 (12 Jan 2025 04:40) (108/66 - 117/70)  BP(mean): --  RR: 16 (12 Jan 2025 04:40) (16 - 16)  SpO2: 100% (12 Jan 2025 04:40) (97% - 100%)    LABS:                        14.7   8.84  )-----------( 195      ( 11 Jan 2025 06:44 )             43.7     01-11    139  |  104  |  17  ----------------------------<  135[H]  4.2   |  25  |  0.8    Ca    9.2      11 Jan 2025 06:44        Urinalysis Basic - ( 11 Jan 2025 06:44 )    Color: x / Appearance: x / SG: x / pH: x  Gluc: 135 mg/dL / Ketone: x  / Bili: x / Urobili: x   Blood: x / Protein: x / Nitrite: x   Leuk Esterase: x / RBC: x / WBC x   Sq Epi: x / Non Sq Epi: x / Bacteria: x        Creatine Kinase: 41 U/L (01.11.25 @ 16:15)         RADIOLOGY:      PHYSICAL EXAM:  CONSTITUTIONAL: No acute distress, well-developed, well-groomed, AAOx3  HEAD: Atraumatic, normocephalic  EYES: EOM intact, PERRLA, conjunctiva and sclera clear  ENT: Supple, no masses, no thyromegaly, no bruits, no JVD; moist mucous membranes  PULMONARY: Clear to auscultation bilaterally; no wheezes, rales, or rhonchi  CARDIOVASCULAR: Regular rate and rhythm; no murmurs, rubs, or gallops  GASTROINTESTINAL: Soft, non-tender, non-distended; bowel sounds present  MUSCULOSKELETAL:  no clubbing, no cyanosis, no edema  NEUROLOGY: non-focal  SKIN: No rashes or lesions; warm and dry

## 2025-01-12 NOTE — PROGRESS NOTE ADULT - SUBJECTIVE AND OBJECTIVE BOX
Podiatry Progress Note    Subjective:  KATHLEEN HARVEY is a  39y Male.   Seen bedside.   Patient is a 39y old  Male who presents with a chief complaint of L heel /ankle and R heel ulcer (12 Jan 2025 14:38)      Past Medical History and Surgical History  PAST MEDICAL & SURGICAL HISTORY:       Objective:  Vital Signs Last 24 Hrs  T(C): 36.7 (12 Jan 2025 04:40), Max: 36.7 (12 Jan 2025 04:40)  T(F): 98 (12 Jan 2025 04:40), Max: 98 (12 Jan 2025 04:40)  HR: 72 (12 Jan 2025 04:40) (69 - 72)  BP: 108/66 (12 Jan 2025 04:40) (108/66 - 117/70)  BP(mean): --  RR: 16 (12 Jan 2025 04:40) (16 - 16)  SpO2: 100% (12 Jan 2025 04:40) (97% - 100%)                            14.7   8.84  )-----------( 195      ( 11 Jan 2025 06:44 )             43.7                 01-11    139  |  104  |  17  ----------------------------<  135[H]  4.2   |  25  |  0.8    Ca    9.2      11 Jan 2025 06:44          Physical Exam Bilateral Lower Extremity Focused:   Derm:   - full thickness wounds to b/l Heels No drainage. Jessica-wound erythema, does not probe or undermine, no malodor - improved  -Ulcer at region of Left Lateral Malleolus, positional - improved  Vascular: DP and PT Pulses Intact; Foot is Warm to Warm to the touch;   Neuro: Protective Sensation Diminished / Moderately Neuropathic   MSK: moderately contracted BLLE with frequent myoclonus spasms.      Assessment:  s/p 01/07, Excisional Debridement of Soft Tissue and Bone of Bilateral Heels  paraplegia      Plan:  Chart reviewed and Patient evaluated. All Questions and Concerns Addressed and Answered  Local Wound Care; Wound dressing changes of Aquacel- Gauze- Abdominal Pad- Kerlix.  Continue w/ Local Wound Care; Q48 Dressing Changes.   Podiatry will continue to monitor while admitted.   Discussed Plan w/ Attending; Dr. Galvan.       Podiatry   Please message on teams for further questions     Podiatry Progress Note    Subjective:  KATHLEEN HARVEY is a  39y Male.   Seen bedside.   Patient is a 39y old  Male who presents with a chief complaint of L heel /ankle and R heel ulcer (12 Jan 2025 14:38)      Past Medical History and Surgical History  PAST MEDICAL & SURGICAL HISTORY:       Objective:  Vital Signs Last 24 Hrs  T(C): 36.7 (12 Jan 2025 04:40), Max: 36.7 (12 Jan 2025 04:40)  T(F): 98 (12 Jan 2025 04:40), Max: 98 (12 Jan 2025 04:40)  HR: 72 (12 Jan 2025 04:40) (69 - 72)  BP: 108/66 (12 Jan 2025 04:40) (108/66 - 117/70)  BP(mean): --  RR: 16 (12 Jan 2025 04:40) (16 - 16)  SpO2: 100% (12 Jan 2025 04:40) (97% - 100%)                            14.7   8.84  )-----------( 195      ( 11 Jan 2025 06:44 )             43.7                 01-11    139  |  104  |  17  ----------------------------<  135[H]  4.2   |  25  |  0.8    Ca    9.2      11 Jan 2025 06:44          Physical Exam Bilateral Lower Extremity Focused:   Derm:   - full thickness wounds to b/l Heels No drainage. minimal Jessica-wound erythema, does not probe or undermine, no malodor - improved  -Ulcer at region of Left Lateral Malleolus, positional - improved  Vascular: DP and PT Pulses Intact; Foot is Warm to Warm to the touch;   Neuro: Protective Sensation Diminished / Moderately Neuropathic   MSK: moderately contracted BLLE with frequent myoclonus spasms.      Assessment:  s/p 01/07, Excisional Debridement of Soft Tissue and Bone of Bilateral Heels  B/L foot and L ankle wounds healing well   paraplegia      Plan:  Chart reviewed and Patient evaluated. All Questions and Concerns Addressed and Answered  Local Wound Care; Wound dressing changes of Aquacel- Gauze- Abdominal Pad- Kerlix q48   Continue w/ Local Wound Care; Q48 Dressing Changes.   Podiatry will continue to monitor while admitted.   Discussed Plan w/ Attending; Dr. Galvan.       Podiatry   Please message on teams for further questions
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
## 2025-01-13 ENCOUNTER — TRANSCRIPTION ENCOUNTER (OUTPATIENT)
Age: 40
End: 2025-01-13

## 2025-01-13 VITALS
RESPIRATION RATE: 16 BRPM | SYSTOLIC BLOOD PRESSURE: 111 MMHG | HEART RATE: 71 BPM | DIASTOLIC BLOOD PRESSURE: 69 MMHG | TEMPERATURE: 97 F

## 2025-01-13 LAB
ANION GAP SERPL CALC-SCNC: 11 MMOL/L — SIGNIFICANT CHANGE UP (ref 7–14)
BUN SERPL-MCNC: 23 MG/DL — HIGH (ref 10–20)
CALCIUM SERPL-MCNC: 9 MG/DL — SIGNIFICANT CHANGE UP (ref 8.4–10.5)
CHLORIDE SERPL-SCNC: 104 MMOL/L — SIGNIFICANT CHANGE UP (ref 98–110)
CO2 SERPL-SCNC: 25 MMOL/L — SIGNIFICANT CHANGE UP (ref 17–32)
CREAT SERPL-MCNC: 0.9 MG/DL — SIGNIFICANT CHANGE UP (ref 0.7–1.5)
CULTURE RESULTS: ABNORMAL
CULTURE RESULTS: ABNORMAL
CULTURE RESULTS: SIGNIFICANT CHANGE UP
CULTURE RESULTS: SIGNIFICANT CHANGE UP
EGFR: 111 ML/MIN/1.73M2 — SIGNIFICANT CHANGE UP
GLUCOSE SERPL-MCNC: 116 MG/DL — HIGH (ref 70–99)
HCT VFR BLD CALC: 41.7 % — LOW (ref 42–52)
HGB BLD-MCNC: 14 G/DL — SIGNIFICANT CHANGE UP (ref 14–18)
MCHC RBC-ENTMCNC: 28.1 PG — SIGNIFICANT CHANGE UP (ref 27–31)
MCHC RBC-ENTMCNC: 33.6 G/DL — SIGNIFICANT CHANGE UP (ref 32–37)
MCV RBC AUTO: 83.6 FL — SIGNIFICANT CHANGE UP (ref 80–94)
NRBC # BLD: 0 /100 WBCS — SIGNIFICANT CHANGE UP (ref 0–0)
ORGANISM # SPEC MICROSCOPIC CNT: ABNORMAL
ORGANISM # SPEC MICROSCOPIC CNT: ABNORMAL
ORGANISM # SPEC MICROSCOPIC CNT: SIGNIFICANT CHANGE UP
PLATELET # BLD AUTO: 195 K/UL — SIGNIFICANT CHANGE UP (ref 130–400)
PMV BLD: 10.4 FL — SIGNIFICANT CHANGE UP (ref 7.4–10.4)
POTASSIUM SERPL-MCNC: 4.2 MMOL/L — SIGNIFICANT CHANGE UP (ref 3.5–5)
POTASSIUM SERPL-SCNC: 4.2 MMOL/L — SIGNIFICANT CHANGE UP (ref 3.5–5)
RBC # BLD: 4.99 M/UL — SIGNIFICANT CHANGE UP (ref 4.7–6.1)
RBC # FLD: 12.7 % — SIGNIFICANT CHANGE UP (ref 11.5–14.5)
SODIUM SERPL-SCNC: 140 MMOL/L — SIGNIFICANT CHANGE UP (ref 135–146)
SPECIMEN SOURCE: SIGNIFICANT CHANGE UP
WBC # BLD: 8.18 K/UL — SIGNIFICANT CHANGE UP (ref 4.8–10.8)
WBC # FLD AUTO: 8.18 K/UL — SIGNIFICANT CHANGE UP (ref 4.8–10.8)

## 2025-01-13 PROCEDURE — 99231 SBSQ HOSP IP/OBS SF/LOW 25: CPT | Mod: GC,25

## 2025-01-13 PROCEDURE — 99239 HOSP IP/OBS DSCHRG MGMT >30: CPT

## 2025-01-13 PROCEDURE — 11042 DBRDMT SUBQ TIS 1ST 20SQCM/<: CPT | Mod: 59,GC

## 2025-01-13 PROCEDURE — 11043 DBRDMT MUSC&/FSCA 1ST 20/<: CPT | Mod: GC

## 2025-01-13 RX ORDER — DAPTOMYCIN 500 MG/10ML
50 INJECTION, POWDER, LYOPHILIZED, FOR SOLUTION INTRAVENOUS
Qty: 25 | Refills: 0
Start: 2025-01-13 | End: 2025-02-06

## 2025-01-13 RX ORDER — MEROPENEM 1 G/20ML
1000 INJECTION, POWDER, FOR SOLUTION INTRAVENOUS
Qty: 75 | Refills: 0
Start: 2025-01-13 | End: 2025-02-06

## 2025-01-13 RX ORDER — DIPHENHYDRAMINE HCL 25 MG
25 TABLET ORAL ONCE
Refills: 0 | Status: DISCONTINUED | OUTPATIENT
Start: 2025-01-13 | End: 2025-01-13

## 2025-01-13 RX ADMIN — VANCOMYCIN HYDROCHLORIDE 166.67 MILLIGRAM(S): 5 INJECTION, POWDER, LYOPHILIZED, FOR SOLUTION INTRAVENOUS at 06:28

## 2025-01-13 RX ADMIN — CHLORHEXIDINE GLUCONATE 1 APPLICATION(S): 1.2 RINSE ORAL at 05:43

## 2025-01-13 RX ADMIN — MEROPENEM 100 MILLIGRAM(S): 1 INJECTION, POWDER, FOR SOLUTION INTRAVENOUS at 20:36

## 2025-01-13 RX ADMIN — MEROPENEM 100 MILLIGRAM(S): 1 INJECTION, POWDER, FOR SOLUTION INTRAVENOUS at 05:43

## 2025-01-13 RX ADMIN — MEROPENEM 100 MILLIGRAM(S): 1 INJECTION, POWDER, FOR SOLUTION INTRAVENOUS at 14:55

## 2025-01-13 RX ADMIN — VANCOMYCIN HYDROCHLORIDE 166.67 MILLIGRAM(S): 5 INJECTION, POWDER, LYOPHILIZED, FOR SOLUTION INTRAVENOUS at 18:07

## 2025-01-13 NOTE — PROGRESS NOTE ADULT - SUBJECTIVE AND OBJECTIVE BOX
Podiatry Progress Note    Subjective:  KATHLEEN HARVEY is a  39y Male who was seen bedside this morning with attending dr. Galvan for evaluation of bilateral foot ulcers   Dressing intact. Minimal strikethrough noted.         Past Medical History and Surgical History  PAST MEDICAL & SURGICAL HISTORY:       Objective:  Vital Signs Last 24 Hrs  T(C): 36.7 (13 Jan 2025 05:00), Max: 36.7 (13 Jan 2025 05:00)  T(F): 98.1 (13 Jan 2025 05:00), Max: 98.1 (13 Jan 2025 05:00)  HR: 80 (13 Jan 2025 05:00) (70 - 89)  BP: 108/71 (13 Jan 2025 05:00) (108/71 - 115/78)  BP(mean): --  RR: 16 (13 Jan 2025 05:00) (16 - 16)  SpO2: 98% (13 Jan 2025 05:00) (97% - 98%)    Parameters below as of 13 Jan 2025 05:00  Patient On (Oxygen Delivery Method): room air                            14.0   8.18  )-----------( 195      ( 13 Jan 2025 06:44 )             41.7                 01-13    140  |  104  |  23[H]  ----------------------------<  116[H]  4.2   |  25  |  0.9    Ca    9.0      13 Jan 2025 06:44      Physical Exam Bilateral Lower Extremity Focused:   Derm:   - full thickness wounds to b/l Heels No drainage. minimal Jessica-wound erythema, does not probe or undermine, no malodor - improved  -Ulcer at region of Left Lateral Malleolus, positional - improved  Vascular: DP and PT Pulses Intact; Foot is Warm to Warm to the touch;   Neuro: Protective Sensation Diminished / Moderately Neuropathic   MSK: moderately contracted BLLE with frequent myoclonus spasms.      Assessment:  s/p 01/07, Excisional Debridement of Soft Tissue and Bone of Bilateral Heels  B/L foot and L ankle wounds healing well   paraplegia      Plan:  Chart reviewed and Patient evaluated. All Questions and Concerns Addressed and Answered  Aseptic debridement of bilateral heels and left lateral ulcer with sterile dermal currette down to level of subcutaneous tissue, WOI.  Local Wound Care; Wound cleasnsed and flushed with Vashe; Dressed with Aquacel- Gauze- Abdominal Pad- Kerlix q48   Recommendation of offloading boots or pillow underneath patient's feet  Rx bilateral surgical shoes (darco)  Continue w/ Local Wound Care; Q48 Dressing Changes.   Podiatry will continue to monitor while admitted.   Discussed Plan w/ Attending; Dr. Galvan.       Podiatry   Please message on teams for further questions

## 2025-01-13 NOTE — PROGRESS NOTE ADULT - ATTENDING COMMENTS
Lv-8/28/23  Nv-none  
Agree with above note  Cont wound care  Abx as per ID - please follow wound cultures  planning for Debridement in OR
Cont wound care   Abx as per ID  Please optimize patient for surgery  Date of surgery not confirmed yet
Stable for D/C from podiatry  Cont wound care  Abx as per ID  F/u as o/p
B/L Heel and L Ankle wounds improving    Sharp Excisional debridement of fibrotic tissue down and including level of deep fascial L Heel, Size 2cmx 2cm  Sharp Excisional debridement of fibrotic tissue down and including level of subcutaneous tissue R Heel, Size 1.5cmx 1cm    Cont wound care   Cont off loading  Cont abx as per ID  Follow up as o/p
B/L Heel improved  - COnt wound care  - Abx as per ID - please follow up OR cultures  - Follow up as o/p
Medical clearance requested  Please optimize the patient prior to OR   Podiatry follow
B/L wounds improving   Cont wound care  Abx as per ID - follow up wound cultures and bone cultures

## 2025-01-13 NOTE — DISCHARGE NOTE NURSING/CASE MANAGEMENT/SOCIAL WORK - FINANCIAL ASSISTANCE
[facility] provides services at a reduced cost to those who are determined to be eligible through [facility]’s financial assistance program. Information regarding [facility]’s financial assistance program can be found by going to [email or web address] or by calling [phone].

## 2025-01-13 NOTE — PROGRESS NOTE ADULT - PROVIDER SPECIALTY LIST ADULT
Hospitalist
Hospitalist
Intervent Radiology
Hospitalist
Infectious Disease
Podiatry
Hospitalist
Infectious Disease
Podiatry
Podiatry
Hospitalist

## 2025-01-13 NOTE — DISCHARGE NOTE NURSING/CASE MANAGEMENT/SOCIAL WORK - PATIENT PORTAL LINK FT
You can access the FollowMyHealth Patient Portal offered by [facility] by registering at the following website: [email or web address]. By joining [facility]’s FollowMyHealth portal, you will also be able to view your health information using other applications (apps) compatible with our system.

## 2025-01-13 NOTE — PHARMACOTHERAPY INTERVENTION NOTE - COMMENTS
As per policy, ordered a vancomycin trough level for 1/7 @ 1500 to assist with further vancomycin dosing optimization.     Shalini Andersen, PharmD  Clinical Pharmacy Specialist, Infectious Diseases  Tele-Antimicrobial Stewardship Program (Tele-ASP)  Tele-ASP Phone: [phone] 
Based of vancomycin level of 21.1 on 1/11 (obtained few hours after dose), appropriate to continue vancomycin 1250mg IV q12h as predicts AUC of 524. Obtain a level on 1/16 if renal function remains the same. Obtain a level earlier if Scr worsens. 
Based on level of 14.3, current dosing of 1250mg IV q12h is appropriate to continue as it predicts a therapeutic AUC of 481. Obtain a level in 3 to 4 days on 1/11 AM labs to continue to monitor. Obtain level sooner if Scr worsens.
Recommended to continue vancomycin 1500mg IV q12hrs, as vancomycin trough on 1/4 @ 1843 returned at 10.9mg/dL. As per [employer] calculations, current vancomycin regimen predicts AUC/ANGELICA to be 436.62 mg/L*h, which is within therapeutic range of 400 - 600 mg/L*h.     Shalini Andersen, PharmD  Clinical Pharmacy Specialist, Infectious Diseases  Tele-Antimicrobial Stewardship Program (Tele-ASP)  Tele-ASP Phone: [phone] 
Patient on vancomycin 1g q12h for SSTI- precisepk predicts a subtherapeutic AUC/ANGELICA of ~284.25. Recommends to increase to 1500mg q12h to achieve an AUC/ANGELICA of ~425.87.   Recommend to increase dose to 1500mg IV q12h and to obtain a random trough tomorrow with AM labs
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
## 2025-01-13 NOTE — PROGRESS NOTE ADULT - SUBJECTIVE AND OBJECTIVE BOX
KATHLEEN HARVEY 39y Male  MRN#: [id number]   Hospital Day: 11d    SUBJECTIVE  Patient is a 39y old Male who presents with a chief complaint of L heel /ankle and R heel ulcer (13 Jan 2025 11:23)  Currently admitted to medicine with the primary diagnosis of Foot infection      INTERVAL HPI AND OVERNIGHT EVENTS:  Patient was examined and seen at bedside. This morning he is resting comfortably in bed and reports no issues or overnight events, is eager to go home        OBJECTIVE  PAST MEDICAL & SURGICAL HISTORY    ALLERGIES:  No Known Allergies    MEDICATIONS:  STANDING MEDICATIONS  chlorhexidine 2% Cloths 1 Application(s) Topical <User Schedule>  enoxaparin Injectable 40 milliGRAM(s) SubCutaneous every 24 hours  meropenem  IVPB 1000 milliGRAM(s) IV Intermittent every 8 hours  nicotine -  14 mG/24Hr(s) Patch 1 Patch Transdermal daily  vancomycin  IVPB 1250 milliGRAM(s) IV Intermittent every 12 hours    PRN MEDICATIONS  acetaminophen     Tablet .. 650 milliGRAM(s) Oral every 6 hours PRN  diphenhydrAMINE 25 milliGRAM(s) Oral once PRN      VITAL SIGNS: Last 24 Hours  T(C): 36.3 (13 Jan 2025 14:31), Max: 36.7 (13 Jan 2025 05:00)  T(F): 97.3 (13 Jan 2025 14:31), Max: 98.1 (13 Jan 2025 05:00)  HR: 71 (13 Jan 2025 14:31) (71 - 89)  BP: 111/69 (13 Jan 2025 14:31) (108/71 - 115/78)  BP(mean): --  RR: 16 (13 Jan 2025 14:31) (16 - 16)  SpO2: 98% (13 Jan 2025 05:00) (97% - 98%)    LABS:                        14.0   8.18  )-----------( 195      ( 13 Jan 2025 06:44 )             41.7     01-13    140  |  104  |  23[H]  ----------------------------<  116[H]  4.2   |  25  |  0.9    Ca    9.0      13 Jan 2025 06:44        Urinalysis Basic - ( 13 Jan 2025 06:44 )    Color: x / Appearance: x / SG: x / pH: x  Gluc: 116 mg/dL / Ketone: x  / Bili: x / Urobili: x   Blood: x / Protein: x / Nitrite: x   Leuk Esterase: x / RBC: x / WBC x   Sq Epi: x / Non Sq Epi: x / Bacteria: x                RADIOLOGY:      PHYSICAL EXAM:  CONSTITUTIONAL: No acute distress, well-developed, well-groomed, AAOx3  HEAD: Atraumatic, normocephalic  EYES: EOM intact, PERRLA, conjunctiva and sclera clear  ENT: Supple, no masses, no thyromegaly, no bruits, no JVD  PULMONARY: Clear to auscultation bilaterally; no wheezes, rales, or rhonchi  CARDIOVASCULAR: Regular rate and rhythm; no murmurs, rubs, or gallops  GASTROINTESTINAL: Soft, non-tender, non-distended; bowel sounds present  MUSCULOSKELETAL: 2+ peripheral pulses; no clubbing, no cyanosis  NEUROLOGY: non-focal  SKIN: No rashes or lesions; warm and dry

## 2025-01-13 NOTE — PROGRESS NOTE ADULT - REASON FOR ADMISSION
L heel /ankle and R heel ulcer

## 2025-01-14 PROBLEM — Z00.00 ENCOUNTER FOR PREVENTIVE HEALTH EXAMINATION: Status: ACTIVE | Noted: 2025-01-14

## 2025-01-21 DIAGNOSIS — L89.613 PRESSURE ULCER OF RIGHT HEEL, STAGE 3: ICD-10-CM

## 2025-01-21 DIAGNOSIS — L89.624 PRESSURE ULCER OF LEFT HEEL, STAGE 4: ICD-10-CM

## 2025-01-21 DIAGNOSIS — F17.210 NICOTINE DEPENDENCE, CIGARETTES, UNCOMPLICATED: ICD-10-CM

## 2025-01-21 DIAGNOSIS — G82.20 PARAPLEGIA, UNSPECIFIED: ICD-10-CM

## 2025-01-21 DIAGNOSIS — T14.90XS INJURY, UNSPECIFIED, SEQUELA: ICD-10-CM

## 2025-01-21 DIAGNOSIS — R39.81 FUNCTIONAL URINARY INCONTINENCE: ICD-10-CM

## 2025-01-21 DIAGNOSIS — R15.9 FULL INCONTINENCE OF FECES: ICD-10-CM

## 2025-01-21 DIAGNOSIS — L89.523 PRESSURE ULCER OF LEFT ANKLE, STAGE 3: ICD-10-CM

## 2025-01-21 DIAGNOSIS — M86.672 OTHER CHRONIC OSTEOMYELITIS, LEFT ANKLE AND FOOT: ICD-10-CM
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
## 2025-01-21 DIAGNOSIS — I96 GANGRENE, NOT ELSEWHERE CLASSIFIED: ICD-10-CM

## 2025-01-21 DIAGNOSIS — M86.671 OTHER CHRONIC OSTEOMYELITIS, RIGHT ANKLE AND FOOT: ICD-10-CM

## 2025-01-22 ENCOUNTER — APPOINTMENT (OUTPATIENT)
Dept: PODIATRY | Facility: CLINIC | Age: 40
End: 2025-01-22

## 2025-01-22 ENCOUNTER — OUTPATIENT (OUTPATIENT)
Dept: OUTPATIENT SERVICES | Facility: HOSPITAL | Age: 40
LOS: 1 days | End: 2025-01-22
Payer: MEDICAID

## 2025-01-22 ENCOUNTER — APPOINTMENT (OUTPATIENT)
Dept: PODIATRY | Facility: CLINIC | Age: 40
End: 2025-01-22
Payer: MEDICAID

## 2025-01-22 DIAGNOSIS — L97.422 NON-PRESSURE CHRONIC ULCER OF LEFT HEEL AND MIDFOOT WITH FAT LAYER EXPOSED: ICD-10-CM

## 2025-01-22 DIAGNOSIS — L97.412 NON-PRESSURE CHRONIC ULCER OF RIGHT HEEL AND MIDFOOT WITH FAT LAYER EXPOSED: ICD-10-CM

## 2025-01-22 DIAGNOSIS — G82.20 PARAPLEGIA, UNSPECIFIED: ICD-10-CM

## 2025-01-22 DIAGNOSIS — Z00.00 ENCOUNTER FOR GENERAL ADULT MEDICAL EXAMINATION WITHOUT ABNORMAL FINDINGS: ICD-10-CM

## 2025-01-22 PROCEDURE — 11046 DBRDMT MUSC&/FSCA EA ADDL: CPT

## 2025-01-22 PROCEDURE — 11046 DBRDMT MUSC&/FSCA EA ADDL: CPT | Mod: 50

## 2025-01-22 PROCEDURE — 11043 DBRDMT MUSC&/FSCA 1ST 20/<: CPT

## 2025-01-22 PROCEDURE — 11043 DBRDMT MUSC&/FSCA 1ST 20/<: CPT | Mod: 50

## 2025-01-24 PROBLEM — L97.412 CHRONIC ULCER OF RIGHT HEEL WITH FAT LAYER EXPOSED: Status: ACTIVE | Noted: 2025-01-24

## 2025-01-24 PROBLEM — G82.20 LOWER PARAPLEGIA: Status: ACTIVE | Noted: 2025-01-24

## 2025-01-24 PROBLEM — L97.422 CHRONIC ULCER OF LEFT HEEL WITH FAT LAYER EXPOSED: Status: ACTIVE | Noted: 2025-01-24
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
## 2025-01-30 DIAGNOSIS — L97.412 NON-PRESSURE CHRONIC ULCER OF RIGHT HEEL AND MIDFOOT WITH FAT LAYER EXPOSED: ICD-10-CM

## 2025-01-30 DIAGNOSIS — G82.20 PARAPLEGIA, UNSPECIFIED: ICD-10-CM

## 2025-01-30 DIAGNOSIS — L97.422 NON-PRESSURE CHRONIC ULCER OF LEFT HEEL AND MIDFOOT WITH FAT LAYER EXPOSED: ICD-10-CM

## 2025-02-05 ENCOUNTER — OUTPATIENT (OUTPATIENT)
Dept: OUTPATIENT SERVICES | Facility: HOSPITAL | Age: 40
LOS: 1 days | End: 2025-02-05
Payer: MEDICAID

## 2025-02-05 ENCOUNTER — APPOINTMENT (OUTPATIENT)
Dept: PODIATRY | Facility: CLINIC | Age: 40
End: 2025-02-05
Payer: MEDICAID

## 2025-02-05 DIAGNOSIS — L97.412 NON-PRESSURE CHRONIC ULCER OF RIGHT HEEL AND MIDFOOT WITH FAT LAYER EXPOSED: ICD-10-CM

## 2025-02-05 DIAGNOSIS — G82.20 PARAPLEGIA, UNSPECIFIED: ICD-10-CM

## 2025-02-05 DIAGNOSIS — L97.422 NON-PRESSURE CHRONIC ULCER OF LEFT HEEL AND MIDFOOT WITH FAT LAYER EXPOSED: ICD-10-CM

## 2025-02-05 DIAGNOSIS — Z00.00 ENCOUNTER FOR GENERAL ADULT MEDICAL EXAMINATION WITHOUT ABNORMAL FINDINGS: ICD-10-CM

## 2025-02-05 PROCEDURE — 11043 DBRDMT MUSC&/FSCA 1ST 20/<: CPT

## 2025-02-12 DIAGNOSIS — L97.422 NON-PRESSURE CHRONIC ULCER OF LEFT HEEL AND MIDFOOT WITH FAT LAYER EXPOSED: ICD-10-CM

## 2025-02-12 DIAGNOSIS — L97.412 NON-PRESSURE CHRONIC ULCER OF RIGHT HEEL AND MIDFOOT WITH FAT LAYER EXPOSED: ICD-10-CM
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
## 2025-02-12 DIAGNOSIS — G82.20 PARAPLEGIA, UNSPECIFIED: ICD-10-CM
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
## 2025-03-05 ENCOUNTER — OUTPATIENT (OUTPATIENT)
Dept: OUTPATIENT SERVICES | Facility: HOSPITAL | Age: 40
LOS: 1 days | End: 2025-03-05
Payer: MEDICAID

## 2025-03-05 ENCOUNTER — APPOINTMENT (OUTPATIENT)
Dept: PODIATRY | Facility: CLINIC | Age: 40
End: 2025-03-05
Payer: MEDICAID
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
## 2025-03-05 DIAGNOSIS — G82.20 PARAPLEGIA, UNSPECIFIED: ICD-10-CM

## 2025-03-05 DIAGNOSIS — L97.422 NON-PRESSURE CHRONIC ULCER OF LEFT HEEL AND MIDFOOT WITH FAT LAYER EXPOSED: ICD-10-CM

## 2025-03-05 DIAGNOSIS — L97.412 NON-PRESSURE CHRONIC ULCER OF RIGHT HEEL AND MIDFOOT WITH FAT LAYER EXPOSED: ICD-10-CM
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
## 2025-03-05 DIAGNOSIS — Z00.00 ENCOUNTER FOR GENERAL ADULT MEDICAL EXAMINATION WITHOUT ABNORMAL FINDINGS: ICD-10-CM

## 2025-03-05 PROCEDURE — 11043 DBRDMT MUSC&/FSCA 1ST 20/<: CPT | Mod: LT

## 2025-03-05 PROCEDURE — 11042 DBRDMT SUBQ TIS 1ST 20SQCM/<: CPT | Mod: 59,RT

## 2025-03-05 PROCEDURE — 11042 DBRDMT SUBQ TIS 1ST 20SQCM/<: CPT | Mod: 59

## 2025-03-19 ENCOUNTER — APPOINTMENT (OUTPATIENT)
Dept: PODIATRY | Facility: CLINIC | Age: 40
End: 2025-03-19

## 2025-03-26 ENCOUNTER — OUTPATIENT (OUTPATIENT)
Dept: OUTPATIENT SERVICES | Facility: HOSPITAL | Age: 40
LOS: 1 days | End: 2025-03-26
Payer: MEDICAID

## 2025-03-26 ENCOUNTER — APPOINTMENT (OUTPATIENT)
Dept: PODIATRY | Facility: CLINIC | Age: 40
End: 2025-03-26
Payer: MEDICAID

## 2025-03-26 DIAGNOSIS — L97.422 NON-PRESSURE CHRONIC ULCER OF LEFT HEEL AND MIDFOOT WITH FAT LAYER EXPOSED: ICD-10-CM

## 2025-03-26 DIAGNOSIS — Z00.00 ENCOUNTER FOR GENERAL ADULT MEDICAL EXAMINATION WITHOUT ABNORMAL FINDINGS: ICD-10-CM

## 2025-03-26 DIAGNOSIS — G82.20 PARAPLEGIA, UNSPECIFIED: ICD-10-CM

## 2025-03-26 PROCEDURE — 11043 DBRDMT MUSC&/FSCA 1ST 20/<: CPT | Mod: LT

## 2025-03-26 PROCEDURE — 11043 DBRDMT MUSC&/FSCA 1ST 20/<: CPT

## 2025-04-03 DIAGNOSIS — L97.422 NON-PRESSURE CHRONIC ULCER OF LEFT HEEL AND MIDFOOT WITH FAT LAYER EXPOSED: ICD-10-CM
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
## 2025-04-03 DIAGNOSIS — G82.20 PARAPLEGIA, UNSPECIFIED: ICD-10-CM

## 2025-04-23 ENCOUNTER — APPOINTMENT (OUTPATIENT)
Dept: PODIATRY | Facility: CLINIC | Age: 40
End: 2025-04-23
Payer: MEDICAID

## 2025-04-23 ENCOUNTER — OUTPATIENT (OUTPATIENT)
Dept: OUTPATIENT SERVICES | Facility: HOSPITAL | Age: 40
LOS: 1 days | End: 2025-04-23
Payer: MEDICAID

## 2025-04-23 DIAGNOSIS — L97.412 NON-PRESSURE CHRONIC ULCER OF RIGHT HEEL AND MIDFOOT WITH FAT LAYER EXPOSED: ICD-10-CM

## 2025-04-23 DIAGNOSIS — G82.20 PARAPLEGIA, UNSPECIFIED: ICD-10-CM

## 2025-04-23 DIAGNOSIS — L03.115 CELLULITIS OF RIGHT LOWER LIMB: ICD-10-CM

## 2025-04-23 DIAGNOSIS — L97.422 NON-PRESSURE CHRONIC ULCER OF LEFT HEEL AND MIDFOOT WITH FAT LAYER EXPOSED: ICD-10-CM

## 2025-04-23 DIAGNOSIS — Z00.00 ENCOUNTER FOR GENERAL ADULT MEDICAL EXAMINATION WITHOUT ABNORMAL FINDINGS: ICD-10-CM

## 2025-04-23 PROCEDURE — 11042 DBRDMT SUBQ TIS 1ST 20SQCM/<: CPT | Mod: 59,LT

## 2025-04-23 PROCEDURE — 99213 OFFICE O/P EST LOW 20 MIN: CPT | Mod: 25

## 2025-04-23 PROCEDURE — 11043 DBRDMT MUSC&/FSCA 1ST 20/<: CPT | Mod: RT

## 2025-04-23 RX ORDER — SULFAMETHOXAZOLE AND TRIMETHOPRIM 800; 160 MG/1; MG/1
800-160 TABLET ORAL TWICE DAILY
Qty: 14 | Refills: 0 | Status: ACTIVE | COMMUNITY
Start: 2025-04-23 | End: 1900-01-01
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
## 2025-05-02 DIAGNOSIS — L97.412 NON-PRESSURE CHRONIC ULCER OF RIGHT HEEL AND MIDFOOT WITH FAT LAYER EXPOSED: ICD-10-CM

## 2025-05-02 DIAGNOSIS — L97.422 NON-PRESSURE CHRONIC ULCER OF LEFT HEEL AND MIDFOOT WITH FAT LAYER EXPOSED: ICD-10-CM

## 2025-05-02 DIAGNOSIS — G82.20 PARAPLEGIA, UNSPECIFIED: ICD-10-CM

## 2025-05-02 DIAGNOSIS — L03.115 CELLULITIS OF RIGHT LOWER LIMB: ICD-10-CM
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
## 2025-05-07 ENCOUNTER — OUTPATIENT (OUTPATIENT)
Dept: OUTPATIENT SERVICES | Facility: HOSPITAL | Age: 40
LOS: 1 days | End: 2025-05-07
Payer: MEDICAID

## 2025-05-07 ENCOUNTER — APPOINTMENT (OUTPATIENT)
Dept: PODIATRY | Facility: CLINIC | Age: 40
End: 2025-05-07
Payer: MEDICAID

## 2025-05-07 DIAGNOSIS — L97.422 NON-PRESSURE CHRONIC ULCER OF LEFT HEEL AND MIDFOOT WITH FAT LAYER EXPOSED: ICD-10-CM

## 2025-05-07 DIAGNOSIS — Z00.00 ENCOUNTER FOR GENERAL ADULT MEDICAL EXAMINATION WITHOUT ABNORMAL FINDINGS: ICD-10-CM

## 2025-05-07 DIAGNOSIS — G82.20 PARAPLEGIA, UNSPECIFIED: ICD-10-CM

## 2025-05-07 DIAGNOSIS — L03.115 CELLULITIS OF RIGHT LOWER LIMB: ICD-10-CM

## 2025-05-07 DIAGNOSIS — L97.412 NON-PRESSURE CHRONIC ULCER OF RIGHT HEEL AND MIDFOOT WITH FAT LAYER EXPOSED: ICD-10-CM

## 2025-05-07 PROCEDURE — 11043 DBRDMT MUSC&/FSCA 1ST 20/<: CPT | Mod: 50

## 2025-05-07 PROCEDURE — 87077 CULTURE AEROBIC IDENTIFY: CPT

## 2025-05-07 PROCEDURE — 11042 DBRDMT SUBQ TIS 1ST 20SQCM/<: CPT | Mod: 25

## 2025-05-07 PROCEDURE — 99213 OFFICE O/P EST LOW 20 MIN: CPT | Mod: 50

## 2025-05-07 PROCEDURE — 11043 DBRDMT MUSC&/FSCA 1ST 20/<: CPT

## 2025-05-07 PROCEDURE — 87070 CULTURE OTHR SPECIMN AEROBIC: CPT

## 2025-05-07 PROCEDURE — 11042 DBRDMT SUBQ TIS 1ST 20SQCM/<: CPT | Mod: 59

## 2025-05-07 PROCEDURE — 99214 OFFICE O/P EST MOD 30 MIN: CPT | Mod: 25

## 2025-05-07 RX ORDER — MUPIROCIN 20 MG/G
2 OINTMENT TOPICAL DAILY
Qty: 1 | Refills: 2 | Status: ACTIVE | COMMUNITY
Start: 2025-05-07 | End: 1900-01-01

## 2025-05-07 RX ORDER — CLINDAMYCIN HYDROCHLORIDE 300 MG/1
300 CAPSULE ORAL
Qty: 21 | Refills: 0 | Status: ACTIVE | COMMUNITY
Start: 2025-05-07 | End: 1900-01-01

## 2025-05-07 RX ORDER — BACITRACIN 500 [IU]/G
500 OINTMENT TOPICAL DAILY
Qty: 1 | Refills: 0 | Status: ACTIVE | COMMUNITY
Start: 2025-05-07 | End: 1900-01-01

## 2025-05-08 DIAGNOSIS — L97.412 NON-PRESSURE CHRONIC ULCER OF RIGHT HEEL AND MIDFOOT WITH FAT LAYER EXPOSED: ICD-10-CM

## 2025-05-09 DIAGNOSIS — L97.412 NON-PRESSURE CHRONIC ULCER OF RIGHT HEEL AND MIDFOOT WITH FAT LAYER EXPOSED: ICD-10-CM

## 2025-05-13 LAB — CULTURE, WOUND AEROBE ANAEROBE: ABNORMAL

## 2025-05-14 DIAGNOSIS — L97.422 NON-PRESSURE CHRONIC ULCER OF LEFT HEEL AND MIDFOOT WITH FAT LAYER EXPOSED: ICD-10-CM
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
## 2025-05-14 DIAGNOSIS — L97.412 NON-PRESSURE CHRONIC ULCER OF RIGHT HEEL AND MIDFOOT WITH FAT LAYER EXPOSED: ICD-10-CM
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
## 2025-05-14 DIAGNOSIS — L03.115 CELLULITIS OF RIGHT LOWER LIMB: ICD-10-CM

## 2025-05-14 DIAGNOSIS — G82.20 PARAPLEGIA, UNSPECIFIED: ICD-10-CM

## 2025-06-11 ENCOUNTER — APPOINTMENT (OUTPATIENT)
Dept: PODIATRY | Facility: CLINIC | Age: 40
End: 2025-06-11
Payer: MEDICAID

## 2025-06-11 ENCOUNTER — OUTPATIENT (OUTPATIENT)
Dept: OUTPATIENT SERVICES | Facility: HOSPITAL | Age: 40
LOS: 1 days | End: 2025-06-11
Payer: MEDICAID

## 2025-06-11 DIAGNOSIS — Z00.00 ENCOUNTER FOR GENERAL ADULT MEDICAL EXAMINATION WITHOUT ABNORMAL FINDINGS: ICD-10-CM

## 2025-06-11 PROBLEM — B35.1 ONYCHOMYCOSIS: Status: ACTIVE | Noted: 2025-06-11

## 2025-06-11 PROCEDURE — 11043 DBRDMT MUSC&/FSCA 1ST 20/<: CPT | Mod: RT

## 2025-06-11 PROCEDURE — 99213 OFFICE O/P EST LOW 20 MIN: CPT | Mod: 25

## 2025-06-11 RX ORDER — CICLOPIROX 71.3 MG/ML
8 SOLUTION TOPICAL
Qty: 1 | Refills: 3 | Status: ACTIVE | COMMUNITY
Start: 2025-06-11 | End: 1900-01-01
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
## 2025-06-18 DIAGNOSIS — L84 CORNS AND CALLOSITIES: ICD-10-CM

## 2025-06-18 DIAGNOSIS — G82.20 PARAPLEGIA, UNSPECIFIED: ICD-10-CM

## 2025-06-18 DIAGNOSIS — L97.412 NON-PRESSURE CHRONIC ULCER OF RIGHT HEEL AND MIDFOOT WITH FAT LAYER EXPOSED: ICD-10-CM

## 2025-06-18 DIAGNOSIS — B35.1 TINEA UNGUIUM: ICD-10-CM
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
## 2025-06-25 ENCOUNTER — APPOINTMENT (OUTPATIENT)
Dept: PODIATRY | Facility: CLINIC | Age: 40
End: 2025-06-25

## 2025-08-14 ENCOUNTER — OUTPATIENT (OUTPATIENT)
Dept: OUTPATIENT SERVICES | Facility: HOSPITAL | Age: 40
LOS: 1 days | End: 2025-08-14
Payer: MEDICAID

## 2025-08-14 ENCOUNTER — APPOINTMENT (OUTPATIENT)
Dept: PODIATRY | Facility: CLINIC | Age: 40
End: 2025-08-14
Payer: MEDICAID

## 2025-08-14 DIAGNOSIS — L03.115 CELLULITIS OF RIGHT LOWER LIMB: ICD-10-CM

## 2025-08-14 DIAGNOSIS — G82.20 PARAPLEGIA, UNSPECIFIED: ICD-10-CM

## 2025-08-14 DIAGNOSIS — L84 CORNS AND CALLOSITIES: ICD-10-CM

## 2025-08-14 DIAGNOSIS — Z00.00 ENCOUNTER FOR GENERAL ADULT MEDICAL EXAMINATION WITHOUT ABNORMAL FINDINGS: ICD-10-CM

## 2025-08-14 DIAGNOSIS — L97.322 NON-PRESSURE CHRONIC ULCER OF LEFT ANKLE WITH FAT LAYER EXPOSED: ICD-10-CM

## 2025-08-14 DIAGNOSIS — L97.412 NON-PRESSURE CHRONIC ULCER OF RIGHT HEEL AND MIDFOOT WITH FAT LAYER EXPOSED: ICD-10-CM

## 2025-08-14 PROCEDURE — 11043 DBRDMT MUSC&/FSCA 1ST 20/<: CPT | Mod: LT

## 2025-08-14 PROCEDURE — 99213 OFFICE O/P EST LOW 20 MIN: CPT | Mod: 25

## 2025-08-14 PROCEDURE — 11042 DBRDMT SUBQ TIS 1ST 20SQCM/<: CPT | Mod: 59,RT

## 2025-08-14 PROCEDURE — 11042 DBRDMT SUBQ TIS 1ST 20SQCM/<: CPT | Mod: RT,59

## 2025-08-14 RX ORDER — AMOXICILLIN AND CLAVULANATE POTASSIUM 875; 125 MG/1; MG/1
875-125 TABLET, COATED ORAL
Qty: 14 | Refills: 0 | Status: ACTIVE | COMMUNITY
Start: 2025-08-14 | End: 1900-01-01

## 2025-08-21 DIAGNOSIS — L97.322 NON-PRESSURE CHRONIC ULCER OF LEFT ANKLE WITH FAT LAYER EXPOSED: ICD-10-CM

## 2025-08-21 DIAGNOSIS — L03.115 CELLULITIS OF RIGHT LOWER LIMB: ICD-10-CM

## 2025-08-21 DIAGNOSIS — L84 CORNS AND CALLOSITIES: ICD-10-CM

## 2025-08-21 DIAGNOSIS — L97.412 NON-PRESSURE CHRONIC ULCER OF RIGHT HEEL AND MIDFOOT WITH FAT LAYER EXPOSED: ICD-10-CM
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
## 2025-08-21 DIAGNOSIS — G82.20 PARAPLEGIA, UNSPECIFIED: ICD-10-CM
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
## 2025-08-28 ENCOUNTER — APPOINTMENT (OUTPATIENT)
Dept: PODIATRY | Facility: CLINIC | Age: 40
End: 2025-08-28
Payer: MEDICAID

## 2025-08-28 ENCOUNTER — OUTPATIENT (OUTPATIENT)
Dept: OUTPATIENT SERVICES | Facility: HOSPITAL | Age: 40
LOS: 1 days | End: 2025-08-28
Payer: MEDICAID

## 2025-08-28 DIAGNOSIS — L97.322 NON-PRESSURE CHRONIC ULCER OF LEFT ANKLE WITH FAT LAYER EXPOSED: ICD-10-CM

## 2025-08-28 DIAGNOSIS — Z00.00 ENCOUNTER FOR GENERAL ADULT MEDICAL EXAMINATION WITHOUT ABNORMAL FINDINGS: ICD-10-CM

## 2025-08-28 DIAGNOSIS — L97.412 NON-PRESSURE CHRONIC ULCER OF RIGHT HEEL AND MIDFOOT WITH FAT LAYER EXPOSED: ICD-10-CM

## 2025-08-28 PROCEDURE — 11042 DBRDMT SUBQ TIS 1ST 20SQCM/<: CPT | Mod: 59

## 2025-08-28 PROCEDURE — 11043 DBRDMT MUSC&/FSCA 1ST 20/<: CPT
